# Patient Record
Sex: FEMALE | Race: AMERICAN INDIAN OR ALASKA NATIVE | NOT HISPANIC OR LATINO | URBAN - METROPOLITAN AREA
[De-identification: names, ages, dates, MRNs, and addresses within clinical notes are randomized per-mention and may not be internally consistent; named-entity substitution may affect disease eponyms.]

---

## 2017-06-07 ENCOUNTER — OUTPATIENT (OUTPATIENT)
Dept: OUTPATIENT SERVICES | Facility: HOSPITAL | Age: 82
LOS: 1 days | Discharge: HOME | End: 2017-06-07

## 2017-06-28 DIAGNOSIS — E11.9 TYPE 2 DIABETES MELLITUS WITHOUT COMPLICATIONS: ICD-10-CM

## 2017-09-25 ENCOUNTER — OUTPATIENT (OUTPATIENT)
Dept: OUTPATIENT SERVICES | Facility: HOSPITAL | Age: 82
LOS: 1 days | Discharge: HOME | End: 2017-09-25

## 2017-09-25 DIAGNOSIS — R94.8 ABNORMAL RESULTS OF FUNCTION STUDIES OF OTHER ORGANS AND SYSTEMS: ICD-10-CM

## 2019-11-11 PROBLEM — Z00.00 ENCOUNTER FOR PREVENTIVE HEALTH EXAMINATION: Status: ACTIVE | Noted: 2019-11-11

## 2021-01-01 ENCOUNTER — INPATIENT (INPATIENT)
Facility: HOSPITAL | Age: 86
LOS: 3 days | End: 2021-04-22
Attending: INTERNAL MEDICINE | Admitting: INTERNAL MEDICINE
Payer: MEDICARE

## 2021-01-01 VITALS
OXYGEN SATURATION: 98 % | DIASTOLIC BLOOD PRESSURE: 72 MMHG | SYSTOLIC BLOOD PRESSURE: 161 MMHG | HEART RATE: 88 BPM | RESPIRATION RATE: 16 BRPM

## 2021-01-01 VITALS
SYSTOLIC BLOOD PRESSURE: 83 MMHG | DIASTOLIC BLOOD PRESSURE: 42 MMHG | HEART RATE: 88 BPM | RESPIRATION RATE: 21 BRPM | OXYGEN SATURATION: 92 %

## 2021-01-01 DIAGNOSIS — Z71.89 OTHER SPECIFIED COUNSELING: ICD-10-CM

## 2021-01-01 DIAGNOSIS — R53.2 FUNCTIONAL QUADRIPLEGIA: ICD-10-CM

## 2021-01-01 DIAGNOSIS — I63.9 CEREBRAL INFARCTION, UNSPECIFIED: ICD-10-CM

## 2021-01-01 DIAGNOSIS — I48.91 UNSPECIFIED ATRIAL FIBRILLATION: ICD-10-CM

## 2021-01-01 DIAGNOSIS — L97.509 NON-PRESSURE CHRONIC ULCER OF OTHER PART OF UNSPECIFIED FOOT WITH UNSPECIFIED SEVERITY: ICD-10-CM

## 2021-01-01 DIAGNOSIS — R06.00 DYSPNEA, UNSPECIFIED: ICD-10-CM

## 2021-01-01 DIAGNOSIS — L97.909 NON-PRESSURE CHRONIC ULCER OF UNSPECIFIED PART OF UNSPECIFIED LOWER LEG WITH UNSPECIFIED SEVERITY: ICD-10-CM

## 2021-01-01 DIAGNOSIS — Z51.5 ENCOUNTER FOR PALLIATIVE CARE: ICD-10-CM

## 2021-01-01 LAB
A1C WITH ESTIMATED AVERAGE GLUCOSE RESULT: 7.7 % — HIGH (ref 4–5.6)
ALBUMIN SERPL ELPH-MCNC: 2.7 G/DL — LOW (ref 3.5–5.2)
ALBUMIN SERPL ELPH-MCNC: 3 G/DL — LOW (ref 3.5–5.2)
ALBUMIN SERPL ELPH-MCNC: 3.3 G/DL — LOW (ref 3.5–5.2)
ALBUMIN SERPL ELPH-MCNC: 3.6 G/DL — SIGNIFICANT CHANGE UP (ref 3.5–5.2)
ALP SERPL-CCNC: 104 U/L — SIGNIFICANT CHANGE UP (ref 30–115)
ALP SERPL-CCNC: 108 U/L — SIGNIFICANT CHANGE UP (ref 30–115)
ALP SERPL-CCNC: 112 U/L — SIGNIFICANT CHANGE UP (ref 30–115)
ALP SERPL-CCNC: 91 U/L — SIGNIFICANT CHANGE UP (ref 30–115)
ALT FLD-CCNC: 10 U/L — SIGNIFICANT CHANGE UP (ref 0–41)
ALT FLD-CCNC: 27 U/L — SIGNIFICANT CHANGE UP (ref 0–41)
ALT FLD-CCNC: 35 U/L — SIGNIFICANT CHANGE UP (ref 0–41)
ALT FLD-CCNC: 9 U/L — SIGNIFICANT CHANGE UP (ref 0–41)
ANION GAP SERPL CALC-SCNC: 13 MMOL/L — SIGNIFICANT CHANGE UP (ref 7–14)
ANION GAP SERPL CALC-SCNC: 15 MMOL/L — HIGH (ref 7–14)
ANION GAP SERPL CALC-SCNC: 17 MMOL/L — HIGH (ref 7–14)
ANION GAP SERPL CALC-SCNC: 17 MMOL/L — HIGH (ref 7–14)
APPEARANCE UR: ABNORMAL
APTT BLD: 26.2 SEC — LOW (ref 27–39.2)
APTT BLD: 27.1 SEC — SIGNIFICANT CHANGE UP (ref 27–39.2)
AST SERPL-CCNC: 15 U/L — SIGNIFICANT CHANGE UP (ref 0–41)
AST SERPL-CCNC: 21 U/L — SIGNIFICANT CHANGE UP (ref 0–41)
AST SERPL-CCNC: 59 U/L — HIGH (ref 0–41)
AST SERPL-CCNC: 83 U/L — HIGH (ref 0–41)
BACTERIA # UR AUTO: ABNORMAL
BASE EXCESS BLDV CALC-SCNC: -7.6 MMOL/L — LOW (ref -2–2)
BASOPHILS # BLD AUTO: 0.02 K/UL — SIGNIFICANT CHANGE UP (ref 0–0.2)
BASOPHILS # BLD AUTO: 0.02 K/UL — SIGNIFICANT CHANGE UP (ref 0–0.2)
BASOPHILS # BLD AUTO: 0.03 K/UL — SIGNIFICANT CHANGE UP (ref 0–0.2)
BASOPHILS NFR BLD AUTO: 0.1 % — SIGNIFICANT CHANGE UP (ref 0–1)
BASOPHILS NFR BLD AUTO: 0.2 % — SIGNIFICANT CHANGE UP (ref 0–1)
BASOPHILS NFR BLD AUTO: 0.2 % — SIGNIFICANT CHANGE UP (ref 0–1)
BILIRUB DIRECT SERPL-MCNC: 0.3 MG/DL — HIGH (ref 0–0.2)
BILIRUB INDIRECT FLD-MCNC: 0.3 MG/DL — SIGNIFICANT CHANGE UP (ref 0.2–1.2)
BILIRUB SERPL-MCNC: 0.6 MG/DL — SIGNIFICANT CHANGE UP (ref 0.2–1.2)
BILIRUB SERPL-MCNC: 0.7 MG/DL — SIGNIFICANT CHANGE UP (ref 0.2–1.2)
BILIRUB UR-MCNC: NEGATIVE — SIGNIFICANT CHANGE UP
BLD GP AB SCN SERPL QL: SIGNIFICANT CHANGE UP
BUN SERPL-MCNC: 30 MG/DL — HIGH (ref 10–20)
BUN SERPL-MCNC: 31 MG/DL — HIGH (ref 10–20)
BUN SERPL-MCNC: 37 MG/DL — HIGH (ref 10–20)
BUN SERPL-MCNC: 47 MG/DL — HIGH (ref 10–20)
CA-I SERPL-SCNC: 1.23 MMOL/L — SIGNIFICANT CHANGE UP (ref 1.12–1.3)
CALCIUM SERPL-MCNC: 8.6 MG/DL — SIGNIFICANT CHANGE UP (ref 8.5–10.1)
CALCIUM SERPL-MCNC: 8.7 MG/DL — SIGNIFICANT CHANGE UP (ref 8.5–10.1)
CALCIUM SERPL-MCNC: 8.7 MG/DL — SIGNIFICANT CHANGE UP (ref 8.5–10.1)
CALCIUM SERPL-MCNC: 8.9 MG/DL — SIGNIFICANT CHANGE UP (ref 8.5–10.1)
CHLORIDE SERPL-SCNC: 108 MMOL/L — SIGNIFICANT CHANGE UP (ref 98–110)
CHLORIDE SERPL-SCNC: 109 MMOL/L — SIGNIFICANT CHANGE UP (ref 98–110)
CHLORIDE SERPL-SCNC: 117 MMOL/L — HIGH (ref 98–110)
CHLORIDE SERPL-SCNC: 118 MMOL/L — HIGH (ref 98–110)
CHOLEST SERPL-MCNC: 153 MG/DL — SIGNIFICANT CHANGE UP
CO2 SERPL-SCNC: 12 MMOL/L — LOW (ref 17–32)
CO2 SERPL-SCNC: 14 MMOL/L — LOW (ref 17–32)
CO2 SERPL-SCNC: 15 MMOL/L — LOW (ref 17–32)
CO2 SERPL-SCNC: 17 MMOL/L — SIGNIFICANT CHANGE UP (ref 17–32)
COLOR SPEC: YELLOW — SIGNIFICANT CHANGE UP
COVID-19 SPIKE DOMAIN AB INTERP: NEGATIVE — SIGNIFICANT CHANGE UP
COVID-19 SPIKE DOMAIN ANTIBODY RESULT: 0.4 U/ML — SIGNIFICANT CHANGE UP
CREAT SERPL-MCNC: 2.2 MG/DL — HIGH (ref 0.7–1.5)
CREAT SERPL-MCNC: 2.4 MG/DL — HIGH (ref 0.7–1.5)
CREAT SERPL-MCNC: 2.9 MG/DL — HIGH (ref 0.7–1.5)
CREAT SERPL-MCNC: 3.5 MG/DL — HIGH (ref 0.7–1.5)
DIFF PNL FLD: ABNORMAL
EOSINOPHIL # BLD AUTO: 0 K/UL — SIGNIFICANT CHANGE UP (ref 0–0.7)
EOSINOPHIL # BLD AUTO: 0.01 K/UL — SIGNIFICANT CHANGE UP (ref 0–0.7)
EOSINOPHIL # BLD AUTO: 0.01 K/UL — SIGNIFICANT CHANGE UP (ref 0–0.7)
EOSINOPHIL NFR BLD AUTO: 0 % — SIGNIFICANT CHANGE UP (ref 0–8)
EOSINOPHIL NFR BLD AUTO: 0.1 % — SIGNIFICANT CHANGE UP (ref 0–8)
EOSINOPHIL NFR BLD AUTO: 0.1 % — SIGNIFICANT CHANGE UP (ref 0–8)
EPI CELLS # UR: 1 /HPF — SIGNIFICANT CHANGE UP (ref 0–5)
ESTIMATED AVERAGE GLUCOSE: 174 MG/DL — HIGH (ref 68–114)
GAS PNL BLDV: 140 MMOL/L — SIGNIFICANT CHANGE UP (ref 136–145)
GAS PNL BLDV: SIGNIFICANT CHANGE UP
GAS PNL BLDV: SIGNIFICANT CHANGE UP
GLUCOSE BLDC GLUCOMTR-MCNC: 102 MG/DL — HIGH (ref 70–99)
GLUCOSE BLDC GLUCOMTR-MCNC: 106 MG/DL — HIGH (ref 70–99)
GLUCOSE BLDC GLUCOMTR-MCNC: 113 MG/DL — HIGH (ref 70–99)
GLUCOSE BLDC GLUCOMTR-MCNC: 123 MG/DL — HIGH (ref 70–99)
GLUCOSE BLDC GLUCOMTR-MCNC: 130 MG/DL — HIGH (ref 70–99)
GLUCOSE BLDC GLUCOMTR-MCNC: 159 MG/DL — HIGH (ref 70–99)
GLUCOSE BLDC GLUCOMTR-MCNC: 162 MG/DL — HIGH (ref 70–99)
GLUCOSE BLDC GLUCOMTR-MCNC: 164 MG/DL — HIGH (ref 70–99)
GLUCOSE BLDC GLUCOMTR-MCNC: 168 MG/DL — HIGH (ref 70–99)
GLUCOSE BLDC GLUCOMTR-MCNC: 169 MG/DL — HIGH (ref 70–99)
GLUCOSE BLDC GLUCOMTR-MCNC: 171 MG/DL — HIGH (ref 70–99)
GLUCOSE BLDC GLUCOMTR-MCNC: 199 MG/DL — HIGH (ref 70–99)
GLUCOSE BLDC GLUCOMTR-MCNC: 203 MG/DL — HIGH (ref 70–99)
GLUCOSE BLDC GLUCOMTR-MCNC: 304 MG/DL — HIGH (ref 70–99)
GLUCOSE BLDC GLUCOMTR-MCNC: 315 MG/DL — HIGH (ref 70–99)
GLUCOSE BLDC GLUCOMTR-MCNC: 68 MG/DL — LOW (ref 70–99)
GLUCOSE BLDC GLUCOMTR-MCNC: 74 MG/DL — SIGNIFICANT CHANGE UP (ref 70–99)
GLUCOSE BLDC GLUCOMTR-MCNC: 78 MG/DL — SIGNIFICANT CHANGE UP (ref 70–99)
GLUCOSE BLDC GLUCOMTR-MCNC: 78 MG/DL — SIGNIFICANT CHANGE UP (ref 70–99)
GLUCOSE BLDC GLUCOMTR-MCNC: 83 MG/DL — SIGNIFICANT CHANGE UP (ref 70–99)
GLUCOSE BLDC GLUCOMTR-MCNC: 92 MG/DL — SIGNIFICANT CHANGE UP (ref 70–99)
GLUCOSE BLDC GLUCOMTR-MCNC: 94 MG/DL — SIGNIFICANT CHANGE UP (ref 70–99)
GLUCOSE SERPL-MCNC: 144 MG/DL — HIGH (ref 70–99)
GLUCOSE SERPL-MCNC: 196 MG/DL — HIGH (ref 70–99)
GLUCOSE SERPL-MCNC: 201 MG/DL — HIGH (ref 70–99)
GLUCOSE SERPL-MCNC: 59 MG/DL — LOW (ref 70–99)
GLUCOSE UR QL: NEGATIVE — SIGNIFICANT CHANGE UP
HCO3 BLDV-SCNC: 19 MMOL/L — LOW (ref 22–29)
HCT VFR BLD CALC: 30.2 % — LOW (ref 37–47)
HCT VFR BLD CALC: 31.8 % — LOW (ref 37–47)
HCT VFR BLD CALC: 33.2 % — LOW (ref 37–47)
HCT VFR BLDA CALC: 31.5 % — LOW (ref 34–44)
HDLC SERPL-MCNC: 48 MG/DL — LOW
HGB BLD CALC-MCNC: 10.3 G/DL — LOW (ref 14–18)
HGB BLD-MCNC: 10.1 G/DL — LOW (ref 12–16)
HGB BLD-MCNC: 8.9 G/DL — LOW (ref 12–16)
HGB BLD-MCNC: 9.6 G/DL — LOW (ref 12–16)
HYALINE CASTS # UR AUTO: 4 /LPF — SIGNIFICANT CHANGE UP (ref 0–7)
IMM GRANULOCYTES NFR BLD AUTO: 0.4 % — HIGH (ref 0.1–0.3)
IMM GRANULOCYTES NFR BLD AUTO: 0.5 % — HIGH (ref 0.1–0.3)
IMM GRANULOCYTES NFR BLD AUTO: 0.6 % — HIGH (ref 0.1–0.3)
INR BLD: 1.19 RATIO — SIGNIFICANT CHANGE UP (ref 0.65–1.3)
INR BLD: 1.31 RATIO — HIGH (ref 0.65–1.3)
KETONES UR-MCNC: SIGNIFICANT CHANGE UP
LACTATE BLDV-MCNC: 1.6 MMOL/L — SIGNIFICANT CHANGE UP (ref 0.5–1.6)
LEUKOCYTE ESTERASE UR-ACNC: ABNORMAL
LIPID PNL WITH DIRECT LDL SERPL: 82 MG/DL — SIGNIFICANT CHANGE UP
LYMPHOCYTES # BLD AUTO: 1.42 K/UL — SIGNIFICANT CHANGE UP (ref 1.2–3.4)
LYMPHOCYTES # BLD AUTO: 10.1 % — LOW (ref 20.5–51.1)
LYMPHOCYTES # BLD AUTO: 11.1 % — LOW (ref 20.5–51.1)
LYMPHOCYTES # BLD AUTO: 2.06 K/UL — SIGNIFICANT CHANGE UP (ref 1.2–3.4)
LYMPHOCYTES # BLD AUTO: 2.53 K/UL — SIGNIFICANT CHANGE UP (ref 1.2–3.4)
LYMPHOCYTES # BLD AUTO: 25.8 % — SIGNIFICANT CHANGE UP (ref 20.5–51.1)
MAGNESIUM SERPL-MCNC: 2.1 MG/DL — SIGNIFICANT CHANGE UP (ref 1.8–2.4)
MAGNESIUM SERPL-MCNC: 2.2 MG/DL — SIGNIFICANT CHANGE UP (ref 1.8–2.4)
MCHC RBC-ENTMCNC: 24.3 PG — LOW (ref 27–31)
MCHC RBC-ENTMCNC: 24.4 PG — LOW (ref 27–31)
MCHC RBC-ENTMCNC: 24.4 PG — LOW (ref 27–31)
MCHC RBC-ENTMCNC: 29.5 G/DL — LOW (ref 32–37)
MCHC RBC-ENTMCNC: 30.2 G/DL — LOW (ref 32–37)
MCHC RBC-ENTMCNC: 30.4 G/DL — LOW (ref 32–37)
MCV RBC AUTO: 80.2 FL — LOW (ref 81–99)
MCV RBC AUTO: 80.9 FL — LOW (ref 81–99)
MCV RBC AUTO: 82.3 FL — SIGNIFICANT CHANGE UP (ref 81–99)
MONOCYTES # BLD AUTO: 0.73 K/UL — HIGH (ref 0.1–0.6)
MONOCYTES # BLD AUTO: 0.93 K/UL — HIGH (ref 0.1–0.6)
MONOCYTES # BLD AUTO: 0.99 K/UL — HIGH (ref 0.1–0.6)
MONOCYTES NFR BLD AUTO: 5.3 % — SIGNIFICANT CHANGE UP (ref 1.7–9.3)
MONOCYTES NFR BLD AUTO: 6.6 % — SIGNIFICANT CHANGE UP (ref 1.7–9.3)
MONOCYTES NFR BLD AUTO: 7.4 % — SIGNIFICANT CHANGE UP (ref 1.7–9.3)
NEUTROPHILS # BLD AUTO: 11.58 K/UL — HIGH (ref 1.4–6.5)
NEUTROPHILS # BLD AUTO: 15.33 K/UL — HIGH (ref 1.4–6.5)
NEUTROPHILS # BLD AUTO: 6.47 K/UL — SIGNIFICANT CHANGE UP (ref 1.4–6.5)
NEUTROPHILS NFR BLD AUTO: 66.1 % — SIGNIFICANT CHANGE UP (ref 42.2–75.2)
NEUTROPHILS NFR BLD AUTO: 82.6 % — HIGH (ref 42.2–75.2)
NEUTROPHILS NFR BLD AUTO: 82.8 % — HIGH (ref 42.2–75.2)
NITRITE UR-MCNC: NEGATIVE — SIGNIFICANT CHANGE UP
NON HDL CHOLESTEROL: 105 MG/DL — SIGNIFICANT CHANGE UP
NRBC # BLD: 0 /100 WBCS — SIGNIFICANT CHANGE UP (ref 0–0)
NT-PROBNP SERPL-SCNC: HIGH PG/ML (ref 0–300)
PCO2 BLDV: 42 MMHG — SIGNIFICANT CHANGE UP (ref 39–42)
PH BLDV: 7.26 — SIGNIFICANT CHANGE UP (ref 7.26–7.43)
PH UR: 6 — SIGNIFICANT CHANGE UP (ref 5–8)
PLATELET # BLD AUTO: 336 K/UL — SIGNIFICANT CHANGE UP (ref 130–400)
PLATELET # BLD AUTO: 351 K/UL — SIGNIFICANT CHANGE UP (ref 130–400)
PLATELET # BLD AUTO: 358 K/UL — SIGNIFICANT CHANGE UP (ref 130–400)
PO2 BLDV: 31 MMHG — SIGNIFICANT CHANGE UP (ref 20–40)
POTASSIUM BLDV-SCNC: 5.2 MMOL/L — SIGNIFICANT CHANGE UP (ref 3.3–5.6)
POTASSIUM SERPL-MCNC: 4.8 MMOL/L — SIGNIFICANT CHANGE UP (ref 3.5–5)
POTASSIUM SERPL-MCNC: 4.9 MMOL/L — SIGNIFICANT CHANGE UP (ref 3.5–5)
POTASSIUM SERPL-MCNC: 5.5 MMOL/L — HIGH (ref 3.5–5)
POTASSIUM SERPL-MCNC: 5.6 MMOL/L — HIGH (ref 3.5–5)
POTASSIUM SERPL-SCNC: 4.8 MMOL/L — SIGNIFICANT CHANGE UP (ref 3.5–5)
POTASSIUM SERPL-SCNC: 4.9 MMOL/L — SIGNIFICANT CHANGE UP (ref 3.5–5)
POTASSIUM SERPL-SCNC: 5.5 MMOL/L — HIGH (ref 3.5–5)
POTASSIUM SERPL-SCNC: 5.6 MMOL/L — HIGH (ref 3.5–5)
PROT SERPL-MCNC: 5.3 G/DL — LOW (ref 6–8)
PROT SERPL-MCNC: 5.6 G/DL — LOW (ref 6–8)
PROT SERPL-MCNC: 6 G/DL — SIGNIFICANT CHANGE UP (ref 6–8)
PROT SERPL-MCNC: 6.4 G/DL — SIGNIFICANT CHANGE UP (ref 6–8)
PROT UR-MCNC: ABNORMAL
PROTHROM AB SERPL-ACNC: 13.7 SEC — HIGH (ref 9.95–12.87)
PROTHROM AB SERPL-ACNC: 15.1 SEC — HIGH (ref 9.95–12.87)
RAPID RVP RESULT: SIGNIFICANT CHANGE UP
RBC # BLD: 3.67 M/UL — LOW (ref 4.2–5.4)
RBC # BLD: 3.93 M/UL — LOW (ref 4.2–5.4)
RBC # BLD: 4.14 M/UL — LOW (ref 4.2–5.4)
RBC # FLD: 16.2 % — HIGH (ref 11.5–14.5)
RBC # FLD: 16.7 % — HIGH (ref 11.5–14.5)
RBC # FLD: 17.2 % — HIGH (ref 11.5–14.5)
RBC CASTS # UR COMP ASSIST: 12 /HPF — HIGH (ref 0–4)
SAO2 % BLDV: 53 % — SIGNIFICANT CHANGE UP
SARS-COV-2 IGG+IGM SERPL QL IA: 0.4 U/ML — SIGNIFICANT CHANGE UP
SARS-COV-2 IGG+IGM SERPL QL IA: NEGATIVE — SIGNIFICANT CHANGE UP
SARS-COV-2 RNA SPEC QL NAA+PROBE: SIGNIFICANT CHANGE UP
SODIUM SERPL-SCNC: 138 MMOL/L — SIGNIFICANT CHANGE UP (ref 135–146)
SODIUM SERPL-SCNC: 140 MMOL/L — SIGNIFICANT CHANGE UP (ref 135–146)
SODIUM SERPL-SCNC: 147 MMOL/L — HIGH (ref 135–146)
SODIUM SERPL-SCNC: 147 MMOL/L — HIGH (ref 135–146)
SP GR SPEC: 1.05 — HIGH (ref 1.01–1.03)
TRIGL SERPL-MCNC: 136 MG/DL — SIGNIFICANT CHANGE UP
TROPONIN T SERPL-MCNC: 0.15 NG/ML — CRITICAL HIGH
UROBILINOGEN FLD QL: SIGNIFICANT CHANGE UP
WBC # BLD: 14.04 K/UL — HIGH (ref 4.8–10.8)
WBC # BLD: 18.51 K/UL — HIGH (ref 4.8–10.8)
WBC # BLD: 9.8 K/UL — SIGNIFICANT CHANGE UP (ref 4.8–10.8)
WBC # FLD AUTO: 14.04 K/UL — HIGH (ref 4.8–10.8)
WBC # FLD AUTO: 18.51 K/UL — HIGH (ref 4.8–10.8)
WBC # FLD AUTO: 9.8 K/UL — SIGNIFICANT CHANGE UP (ref 4.8–10.8)
WBC UR QL: 262 /HPF — HIGH (ref 0–5)

## 2021-01-01 PROCEDURE — 99497 ADVNCD CARE PLAN 30 MIN: CPT

## 2021-01-01 PROCEDURE — 70496 CT ANGIOGRAPHY HEAD: CPT | Mod: 26

## 2021-01-01 PROCEDURE — 99233 SBSQ HOSP IP/OBS HIGH 50: CPT

## 2021-01-01 PROCEDURE — 70551 MRI BRAIN STEM W/O DYE: CPT | Mod: 26

## 2021-01-01 PROCEDURE — 99497 ADVNCD CARE PLAN 30 MIN: CPT | Mod: 25

## 2021-01-01 PROCEDURE — 99222 1ST HOSP IP/OBS MODERATE 55: CPT

## 2021-01-01 PROCEDURE — 99223 1ST HOSP IP/OBS HIGH 75: CPT

## 2021-01-01 PROCEDURE — 71045 X-RAY EXAM CHEST 1 VIEW: CPT | Mod: 26

## 2021-01-01 PROCEDURE — 99232 SBSQ HOSP IP/OBS MODERATE 35: CPT

## 2021-01-01 PROCEDURE — 99291 CRITICAL CARE FIRST HOUR: CPT | Mod: CS,GC

## 2021-01-01 PROCEDURE — 70450 CT HEAD/BRAIN W/O DYE: CPT | Mod: 26,59,MA

## 2021-01-01 PROCEDURE — 70498 CT ANGIOGRAPHY NECK: CPT | Mod: 26

## 2021-01-01 PROCEDURE — 93010 ELECTROCARDIOGRAM REPORT: CPT

## 2021-01-01 PROCEDURE — 99221 1ST HOSP IP/OBS SF/LOW 40: CPT

## 2021-01-01 PROCEDURE — 0042T: CPT

## 2021-01-01 RX ORDER — AMLODIPINE BESYLATE 2.5 MG/1
5 TABLET ORAL DAILY
Refills: 0 | Status: DISCONTINUED | OUTPATIENT
Start: 2021-01-01 | End: 2021-01-01

## 2021-01-01 RX ORDER — ENOXAPARIN SODIUM 100 MG/ML
40 INJECTION SUBCUTANEOUS DAILY
Refills: 0 | Status: DISCONTINUED | OUTPATIENT
Start: 2021-01-01 | End: 2021-01-01

## 2021-01-01 RX ORDER — SODIUM CHLORIDE 9 MG/ML
1000 INJECTION INTRAMUSCULAR; INTRAVENOUS; SUBCUTANEOUS
Refills: 0 | Status: DISCONTINUED | OUTPATIENT
Start: 2021-01-01 | End: 2021-01-01

## 2021-01-01 RX ORDER — DEXTROSE 50 % IN WATER 50 %
15 SYRINGE (ML) INTRAVENOUS ONCE
Refills: 0 | Status: DISCONTINUED | OUTPATIENT
Start: 2021-01-01 | End: 2021-01-01

## 2021-01-01 RX ORDER — METOPROLOL TARTRATE 50 MG
5 TABLET ORAL ONCE
Refills: 0 | Status: COMPLETED | OUTPATIENT
Start: 2021-01-01 | End: 2021-01-01

## 2021-01-01 RX ORDER — SCOPALAMINE 1 MG/3D
1 PATCH, EXTENDED RELEASE TRANSDERMAL
Refills: 0 | Status: DISCONTINUED | OUTPATIENT
Start: 2021-01-01 | End: 2021-01-01

## 2021-01-01 RX ORDER — GLUCAGON INJECTION, SOLUTION 0.5 MG/.1ML
1 INJECTION, SOLUTION SUBCUTANEOUS ONCE
Refills: 0 | Status: DISCONTINUED | OUTPATIENT
Start: 2021-01-01 | End: 2021-01-01

## 2021-01-01 RX ORDER — SODIUM CHLORIDE 9 MG/ML
1000 INJECTION, SOLUTION INTRAVENOUS ONCE
Refills: 0 | Status: COMPLETED | OUTPATIENT
Start: 2021-01-01 | End: 2021-01-01

## 2021-01-01 RX ORDER — GLYBURIDE 5 MG
2 TABLET ORAL
Qty: 0 | Refills: 0 | DISCHARGE

## 2021-01-01 RX ORDER — CLOPIDOGREL BISULFATE 75 MG/1
75 TABLET, FILM COATED ORAL DAILY
Refills: 0 | Status: DISCONTINUED | OUTPATIENT
Start: 2021-01-01 | End: 2021-01-01

## 2021-01-01 RX ORDER — ASPIRIN/CALCIUM CARB/MAGNESIUM 324 MG
300 TABLET ORAL DAILY
Refills: 0 | Status: DISCONTINUED | OUTPATIENT
Start: 2021-01-01 | End: 2021-01-01

## 2021-01-01 RX ORDER — DEXTROSE 50 % IN WATER 50 %
25 SYRINGE (ML) INTRAVENOUS ONCE
Refills: 0 | Status: DISCONTINUED | OUTPATIENT
Start: 2021-01-01 | End: 2021-01-01

## 2021-01-01 RX ORDER — AMLODIPINE BESYLATE 2.5 MG/1
1 TABLET ORAL
Qty: 0 | Refills: 0 | DISCHARGE

## 2021-01-01 RX ORDER — SODIUM CHLORIDE 9 MG/ML
1000 INJECTION, SOLUTION INTRAVENOUS
Refills: 0 | Status: DISCONTINUED | OUTPATIENT
Start: 2021-01-01 | End: 2021-01-01

## 2021-01-01 RX ORDER — ATORVASTATIN CALCIUM 80 MG/1
80 TABLET, FILM COATED ORAL AT BEDTIME
Refills: 0 | Status: DISCONTINUED | OUTPATIENT
Start: 2021-01-01 | End: 2021-01-01

## 2021-01-01 RX ORDER — INSULIN HUMAN 100 [IU]/ML
10 INJECTION, SOLUTION SUBCUTANEOUS ONCE
Refills: 0 | Status: COMPLETED | OUTPATIENT
Start: 2021-01-01 | End: 2021-01-01

## 2021-01-01 RX ORDER — HEPARIN SODIUM 5000 [USP'U]/ML
5000 INJECTION INTRAVENOUS; SUBCUTANEOUS EVERY 12 HOURS
Refills: 0 | Status: DISCONTINUED | OUTPATIENT
Start: 2021-01-01 | End: 2021-01-01

## 2021-01-01 RX ORDER — DEXTROSE 50 % IN WATER 50 %
50 SYRINGE (ML) INTRAVENOUS ONCE
Refills: 0 | Status: COMPLETED | OUTPATIENT
Start: 2021-01-01 | End: 2021-01-01

## 2021-01-01 RX ORDER — COLLAGENASE CLOSTRIDIUM HIST. 250 UNIT/G
1 OINTMENT (GRAM) TOPICAL
Refills: 0 | Status: DISCONTINUED | OUTPATIENT
Start: 2021-01-01 | End: 2021-01-01

## 2021-01-01 RX ORDER — INSULIN GLARGINE 100 [IU]/ML
10 INJECTION, SOLUTION SUBCUTANEOUS AT BEDTIME
Refills: 0 | Status: DISCONTINUED | OUTPATIENT
Start: 2021-01-01 | End: 2021-01-01

## 2021-01-01 RX ORDER — ACETAMINOPHEN 500 MG
650 TABLET ORAL EVERY 6 HOURS
Refills: 0 | Status: DISCONTINUED | OUTPATIENT
Start: 2021-01-01 | End: 2021-01-01

## 2021-01-01 RX ORDER — METFORMIN HYDROCHLORIDE 850 MG/1
1 TABLET ORAL
Qty: 0 | Refills: 0 | DISCHARGE

## 2021-01-01 RX ORDER — DEXTROSE 50 % IN WATER 50 %
12.5 SYRINGE (ML) INTRAVENOUS ONCE
Refills: 0 | Status: DISCONTINUED | OUTPATIENT
Start: 2021-01-01 | End: 2021-01-01

## 2021-01-01 RX ORDER — CEFEPIME 1 G/1
2000 INJECTION, POWDER, FOR SOLUTION INTRAMUSCULAR; INTRAVENOUS ONCE
Refills: 0 | Status: COMPLETED | OUTPATIENT
Start: 2021-01-01 | End: 2021-01-01

## 2021-01-01 RX ORDER — MORPHINE SULFATE 50 MG/1
5 CAPSULE, EXTENDED RELEASE ORAL EVERY 6 HOURS
Refills: 0 | Status: DISCONTINUED | OUTPATIENT
Start: 2021-01-01 | End: 2021-01-01

## 2021-01-01 RX ORDER — MIDODRINE HYDROCHLORIDE 2.5 MG/1
10 TABLET ORAL THREE TIMES A DAY
Refills: 0 | Status: DISCONTINUED | OUTPATIENT
Start: 2021-01-01 | End: 2021-01-01

## 2021-01-01 RX ORDER — FUROSEMIDE 40 MG
40 TABLET ORAL ONCE
Refills: 0 | Status: COMPLETED | OUTPATIENT
Start: 2021-01-01 | End: 2021-01-01

## 2021-01-01 RX ORDER — INSULIN LISPRO 100/ML
VIAL (ML) SUBCUTANEOUS
Refills: 0 | Status: DISCONTINUED | OUTPATIENT
Start: 2021-01-01 | End: 2021-01-01

## 2021-01-01 RX ORDER — INSULIN LISPRO 100/ML
3 VIAL (ML) SUBCUTANEOUS
Refills: 0 | Status: DISCONTINUED | OUTPATIENT
Start: 2021-01-01 | End: 2021-01-01

## 2021-01-01 RX ORDER — SODIUM CHLORIDE 9 MG/ML
500 INJECTION INTRAMUSCULAR; INTRAVENOUS; SUBCUTANEOUS ONCE
Refills: 0 | Status: COMPLETED | OUTPATIENT
Start: 2021-01-01 | End: 2021-01-01

## 2021-01-01 RX ADMIN — INSULIN HUMAN 10 UNIT(S): 100 INJECTION, SOLUTION SUBCUTANEOUS at 12:36

## 2021-01-01 RX ADMIN — Medication 50 MILLILITER(S): at 12:36

## 2021-01-01 RX ADMIN — Medication 300 MILLIGRAM(S): at 12:13

## 2021-01-01 RX ADMIN — Medication 1 APPLICATION(S): at 05:24

## 2021-01-01 RX ADMIN — SCOPALAMINE 1 PATCH: 1 PATCH, EXTENDED RELEASE TRANSDERMAL at 19:26

## 2021-01-01 RX ADMIN — Medication 1 APPLICATION(S): at 05:49

## 2021-01-01 RX ADMIN — SODIUM CHLORIDE 65 MILLILITER(S): 9 INJECTION INTRAMUSCULAR; INTRAVENOUS; SUBCUTANEOUS at 12:36

## 2021-01-01 RX ADMIN — Medication 2: at 05:50

## 2021-01-01 RX ADMIN — SODIUM CHLORIDE 65 MILLILITER(S): 9 INJECTION INTRAMUSCULAR; INTRAVENOUS; SUBCUTANEOUS at 00:05

## 2021-01-01 RX ADMIN — Medication 300 MILLIGRAM(S): at 21:53

## 2021-01-01 RX ADMIN — Medication 300 MILLIGRAM(S): at 15:58

## 2021-01-01 RX ADMIN — Medication 300 MILLIGRAM(S): at 14:48

## 2021-01-01 RX ADMIN — Medication 1 APPLICATION(S): at 18:12

## 2021-01-01 RX ADMIN — Medication 5 MILLIGRAM(S): at 00:05

## 2021-01-01 RX ADMIN — HEPARIN SODIUM 5000 UNIT(S): 5000 INJECTION INTRAVENOUS; SUBCUTANEOUS at 18:12

## 2021-01-01 RX ADMIN — CEFEPIME 100 MILLIGRAM(S): 1 INJECTION, POWDER, FOR SOLUTION INTRAMUSCULAR; INTRAVENOUS at 17:23

## 2021-01-01 RX ADMIN — SODIUM CHLORIDE 65 MILLILITER(S): 9 INJECTION INTRAMUSCULAR; INTRAVENOUS; SUBCUTANEOUS at 18:16

## 2021-01-01 RX ADMIN — INSULIN GLARGINE 10 UNIT(S): 100 INJECTION, SOLUTION SUBCUTANEOUS at 21:57

## 2021-01-01 RX ADMIN — Medication 5 MILLIGRAM(S): at 00:48

## 2021-01-01 RX ADMIN — SODIUM CHLORIDE 2000 MILLILITER(S): 9 INJECTION INTRAMUSCULAR; INTRAVENOUS; SUBCUTANEOUS at 20:24

## 2021-01-01 RX ADMIN — SCOPALAMINE 1 PATCH: 1 PATCH, EXTENDED RELEASE TRANSDERMAL at 03:31

## 2021-01-01 RX ADMIN — Medication 1 APPLICATION(S): at 17:03

## 2021-01-01 RX ADMIN — Medication 40 MILLIGRAM(S): at 03:32

## 2021-01-01 RX ADMIN — SODIUM CHLORIDE 1000 MILLILITER(S): 9 INJECTION, SOLUTION INTRAVENOUS at 18:00

## 2021-01-01 RX ADMIN — HEPARIN SODIUM 5000 UNIT(S): 5000 INJECTION INTRAVENOUS; SUBCUTANEOUS at 05:49

## 2021-01-01 RX ADMIN — SODIUM CHLORIDE 65 MILLILITER(S): 9 INJECTION INTRAMUSCULAR; INTRAVENOUS; SUBCUTANEOUS at 08:14

## 2021-01-01 RX ADMIN — HEPARIN SODIUM 5000 UNIT(S): 5000 INJECTION INTRAVENOUS; SUBCUTANEOUS at 17:50

## 2021-01-01 RX ADMIN — SCOPALAMINE 1 PATCH: 1 PATCH, EXTENDED RELEASE TRANSDERMAL at 19:40

## 2021-01-01 RX ADMIN — Medication 3 UNIT(S): at 05:51

## 2021-01-01 RX ADMIN — HEPARIN SODIUM 5000 UNIT(S): 5000 INJECTION INTRAVENOUS; SUBCUTANEOUS at 05:24

## 2021-01-01 RX ADMIN — ATORVASTATIN CALCIUM 80 MILLIGRAM(S): 80 TABLET, FILM COATED ORAL at 22:41

## 2021-01-01 RX ADMIN — Medication 50 MILLILITER(S): at 05:23

## 2021-01-01 RX ADMIN — Medication 8: at 12:10

## 2021-01-01 RX ADMIN — Medication 650 MILLIGRAM(S): at 06:05

## 2021-01-01 RX ADMIN — CLOPIDOGREL BISULFATE 75 MILLIGRAM(S): 75 TABLET, FILM COATED ORAL at 12:13

## 2021-01-01 RX ADMIN — Medication 50 MILLILITER(S): at 12:58

## 2021-01-01 RX ADMIN — HEPARIN SODIUM 5000 UNIT(S): 5000 INJECTION INTRAVENOUS; SUBCUTANEOUS at 18:05

## 2021-01-01 RX ADMIN — SCOPALAMINE 1 PATCH: 1 PATCH, EXTENDED RELEASE TRANSDERMAL at 19:30

## 2021-01-01 RX ADMIN — Medication 3 UNIT(S): at 12:11

## 2021-04-18 NOTE — ED PROVIDER NOTE - PHYSICAL EXAMINATION
CONSTITUTIONAL: in no acute distress.   SKIN: warm, dry  HEAD: Normocephalic.  EYES: PERRL.  ENT: Airway clear.  NECK: Supple.  LYMPH: No acute cervical adenopathy.  CARD: Regular rate and rhythm.   RESP: No wheezing, rales or rhonchi.  ABD: soft ntnd  EXT: No clubbing, cyanosis.   NEURO: Does not respond to pain but makes occasional purposeful movements, pupils reactive to light  PSYCH: Cooperative, appropriate.

## 2021-04-18 NOTE — ED PROVIDER NOTE - IMAGING STUDIES ADDITIONAL INFORMATION FREE TEXT
updated wet read - +L sided pleural effusion, apparently new compared to prior though limited evaluation 2/2 rotated positioning

## 2021-04-18 NOTE — ED PROVIDER NOTE - CARE PLAN
Principal Discharge DX:	Stroke  Secondary Diagnosis:	Atrial fibrillation with RVR  Secondary Diagnosis:	Elevated troponin  Secondary Diagnosis:	CARON (acute kidney injury)  Secondary Diagnosis:	Leg ulcer

## 2021-04-18 NOTE — CONSULT NOTE ADULT - SUBJECTIVE AND OBJECTIVE BOX
**STROKE CODE CONSULT NOTE**    Last known well time/Time of onset of symptoms: 4/18 1 pm     HPI: 89 year old female, PMHx dm, ulcers, left leg fx, ulcers, chf, afib presents for AMS. As per son at bedside patient usually is verbal and has eyes open. Today starting around 1 pm, son noticed that patient was unresponsive and not able to open eyes. Patient was recently discharged from [facility] s/p fall, leg fx and ulcer debridements. Stroke code activated in ed.     PAST MEDICAL & SURGICAL HISTORY:  Diabetes mellitus    CHF (congestive heart failure)      SOCIAL HISTORY:  Denies smoking, drinking, or drug use        MEDICATIONS  (STANDING):    MEDICATIONS  (PRN):      Allergies    No Known Allergies    Intolerances        Vital Signs Last 24 Hrs  T(C): --  T(F): --  HR: 88 (18 Apr 2021 16:55) (88 - 88)  BP: 161/72 (18 Apr 2021 16:55) (161/72 - 161/72)  BP(mean): --  RR: 16 (18 Apr 2021 16:55) (16 - 16)  SpO2: 98% (18 Apr 2021 16:55) (98% - 98%)    PHYSICAL EXAM:  Constitutional: WDWN; NAD    NIH Stroke Scale: LOC	(2) Not alert; requires repeated stimulation to attend, or is obtunded and requires strong or painful stimulation to make movements (not stereotyped)  NIH Stroke Scale: LOC Question	(0) Answers both questions correctly  NIH Stroke Scale: LOC Command	(0) Performs both tasks correctly  NIH Stroke Scale: Gaze	(0) Normal  NIH Stroke Scale: Visual	(0) No visual loss  NIH Stroke Scale: Facial	(2) Partial paralysis (total or near-total paralysis of lower face)  NIH Stroke Scale: Arm Left	(3) No effort against gravity; limb falls  NIH Stroke Scale: Arm Right	(3) No effort against gravity; limb falls  NIH Stroke Scale: Leg Left	(3) No effort against gravity; leg falls to bed immediately  NIH Stroke Scale: Leg Right	(3) No effort against gravity; leg falls to bed immediately  NIH Stroke Scale: Ataxia	(0) Absent  NIH Stroke Scale: Sensory	(0) Normal; no sensory loss  NIH Stroke Scale: Language	(0) No aphasia; normal  NIH Stroke Scale: Dysarthria	(0) Normal  NIH Stroke Scale: Extinct Inattention	(0) No abnormality  NIH Stroke Scale: Total	16    mRS Score	(5) Severe disability. Requires constant nursing care and attention, bedridden, incontent.    Fingerstick Blood Glucose: CAPILLARY BLOOD GLUCOSE  155 (18 Apr 2021 18:06)        LABS:                        10.1   9.80  )-----------( 351      ( 18 Apr 2021 17:04 )             33.2     04-18    138  |  108  |  30<H>  ----------------------------<  144<H>  5.5<H>   |  15<L>  |  2.2<H>    Ca    8.9      18 Apr 2021 17:04    TPro  6.4  /  Alb  3.6  /  TBili  0.6  /  DBili  x   /  AST  21  /  ALT  9   /  AlkPhos  104  04-18    PT/INR - ( 18 Apr 2021 17:04 )   PT: 13.70 sec;   INR: 1.19 ratio         PTT - ( 18 Apr 2021 17:04 )  PTT:27.1 sec      RADIOLOGY & ADDITIONAL STUDIES:    Reason IV-tPA not given: Out of window for TPA     ASSESSMENT/PLAN:    89y Female w/ PMH coming in with AMS.     CT head: Findings consistent with left LOY/MCA territorial infarct with mild mass effect. No midline shift. No intracranial hemorrhage.  CTA/CTP with   Initial NIHSS 16    1)Admit to Tele Unit, medicine   -Load with Asa and plavix   -Start Atorvastatin 80 mg daily     2) Labs: Obtain Hemoglobin A1c, Lipid profile set, and TSH    3) Other tests:  -Stroke neuro checks q1 hours  -MRI  -ASAEL (NPO after midnight)  -SBP goal   -PT and OT eval    4)DVT ppx - DVT prophylaxis and SCDs    No plans for NI intervention as per Dr. Handley, Telestroke     Neurocritical Care will follow  Elli Vasquez NP  ex. [phone]           **STROKE CODE CONSULT NOTE**    Last known well time/Time of onset of symptoms: 4/18 1 pm     HPI: 89 year old female, PMHx dm, ulcers, left leg fx, ulcers, chf, afib presents for AMS. As per son at bedside patient usually is verbal and has eyes open. Today starting around 1 pm, son noticed that patient was unresponsive and not able to open eyes. Patient was recently discharged from [facility] s/p fall, leg fx and ulcer debridements. Stroke code activated in ed.     PAST MEDICAL & SURGICAL HISTORY:  Diabetes mellitus    CHF (congestive heart failure)      SOCIAL HISTORY:  Denies smoking, drinking, or drug use        MEDICATIONS  (STANDING):    MEDICATIONS  (PRN):      Allergies    No Known Allergies    Intolerances        Vital Signs Last 24 Hrs  T(C): --  T(F): --  HR: 88 (18 Apr 2021 16:55) (88 - 88)  BP: 161/72 (18 Apr 2021 16:55) (161/72 - 161/72)  BP(mean): --  RR: 16 (18 Apr 2021 16:55) (16 - 16)  SpO2: 98% (18 Apr 2021 16:55) (98% - 98%)    PHYSICAL EXAM:  Constitutional: WDWN; NAD    NIH Stroke Scale: LOC	(2) Not alert; requires repeated stimulation to attend, or is obtunded and requires strong or painful stimulation to make movements (not stereotyped)  NIH Stroke Scale: LOC Question	(0) Answers both questions correctly  NIH Stroke Scale: LOC Command	(0) Performs both tasks correctly  NIH Stroke Scale: Gaze	(0) Normal  NIH Stroke Scale: Visual	(0) No visual loss  NIH Stroke Scale: Facial	(2) Partial paralysis (total or near-total paralysis of lower face)  NIH Stroke Scale: Arm Left	(3) No effort against gravity; limb falls  NIH Stroke Scale: Arm Right	(3) No effort against gravity; limb falls  NIH Stroke Scale: Leg Left	(3) No effort against gravity; leg falls to bed immediately  NIH Stroke Scale: Leg Right	(3) No effort against gravity; leg falls to bed immediately  NIH Stroke Scale: Ataxia	(0) Absent  NIH Stroke Scale: Sensory	(0) Normal; no sensory loss  NIH Stroke Scale: Language	(0) No aphasia; normal  NIH Stroke Scale: Dysarthria	(0) Normal  NIH Stroke Scale: Extinct Inattention	(0) No abnormality  NIH Stroke Scale: Total	16    mRS Score	(5) Severe disability. Requires constant nursing care and attention, bedridden, incontent.    Fingerstick Blood Glucose: CAPILLARY BLOOD GLUCOSE  155 (18 Apr 2021 18:06)        LABS:                        10.1   9.80  )-----------( 351      ( 18 Apr 2021 17:04 )             33.2     04-18    138  |  108  |  30<H>  ----------------------------<  144<H>  5.5<H>   |  15<L>  |  2.2<H>    Ca    8.9      18 Apr 2021 17:04    TPro  6.4  /  Alb  3.6  /  TBili  0.6  /  DBili  x   /  AST  21  /  ALT  9   /  AlkPhos  104  04-18    PT/INR - ( 18 Apr 2021 17:04 )   PT: 13.70 sec;   INR: 1.19 ratio         PTT - ( 18 Apr 2021 17:04 )  PTT:27.1 sec      RADIOLOGY & ADDITIONAL STUDIES:    Reason IV-tPA not given: Out of window for TPA     ASSESSMENT/PLAN:    89y Female w/ PMH coming in with AMS.     CT head: Findings consistent with left LOY/MCA territorial infarct with mild mass effect. No midline shift. No intracranial hemorrhage.  CTA/CTP with Left carotid occulsion. F/u Official read [ ]   Initial NIHSS 16    1)Admit to Tele Unit, medicine   -Load with Asa and plavix   -Start Atorvastatin 80 mg daily     2) Labs: Obtain Hemoglobin A1c, Lipid profile set, and TSH    3) Other tests:  -Stroke neuro checks q1 hours  -MRI  -ASAEL (NPO after midnight)  -SBP goal 140-160  -PT and OT eval    4)DVT ppx - DVT prophylaxis and SCDs    No plans for NI intervention as per Dr. Handley, Telestroke     Neurocritical Care will follow  Elli Vasquez NP  ex. [phone]

## 2021-04-18 NOTE — ED ADULT NURSE NOTE - OBJECTIVE STATEMENT
Pts son was unable to wake her up this afternoon. Pt was last seen by son around 4:30am. Stroke code was activated.

## 2021-04-18 NOTE — H&P ADULT - ASSESSMENT
89 years old female with PMHx of DM, HTN, bilateral diabetic foot ulcers, Atrial fibrillation ( no AC) , CHF, who presents to ED with complaint of non-responsiveness.    Impression :    # acute ischemic stroke  # bilateral diabetic foot ulcers, Left tibial Fracture    # acute ischemic stroke  - CTH shows acute infarct in left LOY and MCA territory  - NIHSS score 16 for motor deficit in all the extremities and non-responsive ness  - out of window for t-PA  - load with ASA, STATIN  - Stroke neuro checks q1 hours  - MRI  - ASAEL (NPO after midnight)  - SBP goal 140-160  - check A1C, lipid profile, 2D echo  - PT and OT eval    # AFIB (CHADVASC ~ 6)  - not on any AC  - EKG c/w AFib , heart rate controlled    # HTN  - keep BP < 180 mmHg in first 24 hours  - can order IV Hydralazine for it    DVT ppx : SCD  GI ppx : IV protonix  bedrest  Code full      GOC  was done with son Abhinav by the bedside. He is inclining towards DNR/DNI but wants to discuss with siblings first. He will get back to us with answers tomorrow. Patient stands code full code for now.     89 years old female with PMHx of DM, HTN, bilateral diabetic foot ulcers, Atrial fibrillation ( no AC) , CHF, who presents to ED with complaint of non-responsiveness.    Impression :    # acute ischemic stroke  # bilateral diabetic foot ulcers, Left tibial Fracture    # acute ischemic stroke (likely cardio-embolic)  - CTH shows acute infarct in left LOY and MCA territory  - NIHSS score 16 for motor deficit in all the extremities and non-responsiveness  - out of window for t-PA  - load with ASA, STATIN  - Stroke neuro checks q1 hours  - MRI  - ASAEL (NPO after midnight)  - SBP goal 140-160  - check A1C, lipid profile, 2D echo  - PT and OT eval    # AFIB (CHADVASC ~ 6)  - not on any AC  - EKG c/w AFib , heart rate controlled    # HTN  - keep BP < 180 mmHg in first 24 hours  - can order IV Hydralazine for it    # bilateral lower ext foot ulcer  - diabetic foot ulcer, last debrided at [facility] 6 weeks ago  - podiatry consult    DVT ppx : SCD  GI ppx : IV protonix  bedrest  Code full      GOC  was done with son Abhinav by the bedside. He is inclining towards DNR/DNI but wants to discuss with siblings first. He will get back to us with answers tomorrow. Patient stands code full code for now.     89 years old female with PMHx of DM, HTN, bilateral diabetic foot ulcers, Atrial fibrillation ( no AC) , CHF, who presents to ED with complaint of non-responsiveness.    Impression :    # acute ischemic stroke  # bilateral diabetic foot ulcers, Left tibial Fracture    # acute ischemic stroke (likely cardio-embolic)  - CTH shows acute infarct in left LOY and MCA territory  - NIHSS score 16 for motor deficit in all the extremities and non-responsiveness  - out of window for t-PA  - load with ASA, STATIN  - Stroke neuro checks q1 hours  - MRI  - check A1C, lipid profile, 2D echo  - PT and OT eval    # AFIB (CHADVASC ~ 6)  - not on any AC  - EKG c/w AFib , heart rate controlled    # HTN  - keep BP < 180 mmHg in first 24 hours      # bilateral lower ext foot ulcer  - diabetic foot ulcer, last debrided at [facility] 6 weeks ago  - podiatry consult    DVT ppx : SCD  GI ppx : IV protonix  bedrest  Code full      GOC  was done with son Abhinav by the bedside. He is inclining towards DNR/DNI but wants to discuss with siblings first. He will get back to us with answers tomorrow. Patient stands code full code for now.     89 years old female with PMHx of DM, HTN, bilateral diabetic foot ulcers, Atrial fibrillation ( no AC) , CHF, who presents to ED with complaint of non-responsiveness.    Impression :    # acute ischemic stroke  # bilateral diabetic foot ulcers, Left tibial Fracture    # acute ischemic stroke (likely cardio-embolic)  - CTH shows acute infarct in left LOY and MCA territory  - NIHSS score 16 for motor deficit in all the extremities and non-responsiveness  - out of window for t-PA  - load with ASA, STATIN  - Stroke neuro checks q1 hours  - MRI  - check A1C, lipid profile, 2D echo  - PT and OT eval    # Chronic AFIB (CHADVASC ~ 6)  - not on any AC  - EKG c/w AFib , heart rate controlled    # HTN  - keep BP < 180 mmHg in first 24 hours      # bilateral lower ext foot ulcer  - diabetic foot ulcer, last debrided at [facility] 6 weeks ago  - podiatry consult    DVT ppx : SCD  GI ppx : IV protonix  bedrest  Code full      GOC  was done with son Abhinav by the bedside. He is inclining towards DNR/DNI but wants to discuss with siblings first. He will get back to us with answers tomorrow. Patient stands code full code for now.

## 2021-04-18 NOTE — ED ADULT NURSE NOTE - NSIMPLEMENTINTERV_GEN_ALL_ED
Implemented All Fall with Harm Risk Interventions:  [place] to call system. Call bell, personal items and telephone within reach. Instruct patient to call for assistance. Room bathroom lighting operational. Non-slip footwear when patient is off stretcher. Physically safe environment: no spills, clutter or unnecessary equipment. Stretcher in lowest position, wheels locked, appropriate side rails in place. Provide visual cue, wrist band, yellow gown, etc. Monitor gait and stability. Monitor for mental status changes and reorient to person, place, and time. Review medications for side effects contributing to fall risk. Reinforce activity limits and safety measures with patient and family. Provide visual clues: red socks.

## 2021-04-18 NOTE — H&P ADULT - NSHPLABSRESULTS_GEN_ALL_CORE
< from: CT Brain Stroke Protocol (04.18.21 @ 17:44) >    IMPRESSION:    Findings consistent with left LOY/MCA territorial infarct with mild mass effect. No midline shift. No intracranial hemorrhage.    Dr. Wesley Hughes discussed preliminary findings with SUSAN GAYTAN on 4/18/2021 5:46 PM with readback.    < end of copied text >

## 2021-04-18 NOTE — ED PROVIDER NOTE - OBJECTIVE STATEMENT
89F with past medical history of paroxysmal AFib not on AC, DM, HTN, recent admission for L tib/fib fracture, which was not repaired or casted 2/2 decubitus ulcer presents for AMS since 4:30PM. PT was eating dinner normally yesterday at baseline and tody is unresponsive.

## 2021-04-18 NOTE — ED PROVIDER NOTE - PROGRESS NOTE DETAILS
EK - Pt poorly responsive on ED arrival - eyes closed, not responding to sternal rub but satting well w/o gurgling or snoring.  Initial concern for sepsis based on dirty looking catheter and urine inside as well as extensive and weeping b/l LE ulcers, but pt afebrile.  Family then clarified that pt was awake and verbal as of 4am last night, which puts pt within window for stroke code.  Stroke code called. EK - Spoke to telestroke and neuro stroke PA.  Pt to CT scan.  No hx dementia at baseline (though pt sig declined since recent hospitalization) but pt nonambulatory 2/2 L ankle fx in the setting of b/l extensive LE ulcers. EK - Neuro recommend ICU admission for q1hr neuro checks.  Spoke to ICU fellow and resident to given signout.  Updated family about CT findings and attempted to discuss goals of care/code status - pt is full code at this time; family had recently started discussing modifying her code status, but remain in early discussion at this time.  Son at bedside will discuss further with his sister, but says that as of now, as long as there remains potential for improvement, they would want everything done. EK - update from telestroke - no intervention at this time - admission to stroke unit or as per neuro here. EK - delayed entry for updated neuro exam on pt's return from CT - does not open eyes (but b/l pupils reactive), ?moaning vs no verbal response, +spontaneous movement of ext x 4 though no purposeful movements noted EK - CTA read concerning for large vessel occlusion.  Paging telestroke again to discuss possible intervention.  As per family discussion with ED attending and ICU resident, pt currently full code (as per son - if any chance for improvement, please do all possible interventions), but family considering changing DNR/DNI. EK - CTA read concerning for vertebral occlusion, no large vessel occlusion intracranially but sig perfusion defect.  Paging telestroke again to discuss possible intervention.  As per family discussion with ED attending and ICU resident, pt currently full code (as per son - if any chance for improvement, please do all possible interventions), but family considering changing DNR/DNI. EK - Spoke to Dr. Handley of telestroke to confirm - CTA findings do not require intervention.

## 2021-04-18 NOTE — H&P ADULT - HISTORY OF PRESENT ILLNESS
89 years old female with PMHx of DM, HTN, diabetic foot ulcers, Atrial fibrillation ( no AC) , CHF, who presents to ED with complaint of non-responsiveness. 89 years old female with PMHx of DM, HTN, bilateral diabetic foot ulcers, Atrial fibrillation ( no AC) , CHF, who presents to ED with complaint of non-responsiveness.     History obtained from son Abhinav by the bedside. As per son at bedside patient usually is verbal and has eyes open. She was seen last seen normal at 4AM today.  Today starting around 1 pm, son noticed that patient was unresponsive and not able to open eyes. she wont respond to the pain stimulus Patient was recently discharged from [facility] s/p fall, leg fx and ulcer debridements. Stroke code activated in ed. NIHSS score was 16. t-PA was not infused because she was out of window for it. During my exam patient did not respond even to pain stimuli, would spontaneously move her left extremities with no response on the right.    In Ed her BP was 161/72 mmHg, HR 88/min with normal oxygen saturation on RA  CTH showed acute infarct on the left side in LOY and MCA territory. 89 years old female with PMHx of DM, HTN, bilateral diabetic foot ulcers, Atrial fibrillation ( no AC) , CHF, who presents to ED with complaint of non-responsiveness.     History obtained from son Abhinav by the bedside. As per son at bedside patient usually is verbal and has eyes open. She was seen last seen normal at 4AM later around 1 pm, son noticed that patient was unresponsive and not able to open eyes. she would not respond to the pain stimulus Patient was recently discharged from [facility] s/p fall, leg fx and ulcer debridements. Stroke code activated in ed. NIHSS score was 16. t-PA was not infused because she was out of window . During exam patient did not respond even to pain stimuli, would spontaneously move her left extremities with no response on the right.    In Ed her BP was 161/72 mmHg, HR 88/min with normal oxygen saturation on RA  CTH showed acute infarct on the left side in LOY and MCA territory. seen by Neuro called to evaluate 89 years old female with PMHx of DM, HTN, bilateral diabetic foot ulcers, Atrial fibrillation ( no AC) ,  CHF (possible HFpEF) , who presents to ED with complaint of non-responsiveness.     History obtained from son Abhinav by the bedside. As per son at bedside patient usually is verbal and has eyes open. She was seen last seen normal at 4AM later around 1 pm, son noticed that patient was unresponsive and not able to open eyes. she would not respond to the pain stimulus Patient was recently discharged [facility] s/p fall, leg fx and ulcer debridements. Stroke code activated in ed. NIHSS score was 16. t-PA was not infused because she was out of window . During exam patient did not respond even to pain stimuli, would spontaneously move her left extremities with no response on the right.    In Ed her BP was 161/72 mmHg, HR 88/min with normal oxygen saturation on RA  CTH showed acute infarct on the left side in LOY and MCA territory. seen by Neuro called to evaluate

## 2021-04-18 NOTE — H&P ADULT - ATTENDING COMMENTS
patient seen and examined, L LOY/ MCA stroke in a patient with A fib with no AC, Neuro f/up, repeat head CT, palliative care eval, stroke unit

## 2021-04-18 NOTE — H&P ADULT - NSHPPHYSICALEXAM_GEN_ALL_CORE
LOS:     VITALS:   T(C): --  HR: 88 (04-18-21 @ 16:55) (88 - 88)  BP: 161/72 (04-18-21 @ 16:55) (161/72 - 161/72)  RR: 16 (04-18-21 @ 16:55) (16 - 16)  SpO2: 98% (04-18-21 @ 16:55) (98% - 98%)    GENERAL: NAD, lying in bed comfortably  HEAD:  Atraumatic, Normocephalic  EYES: EOMI, PERRLA, conjunctiva and sclera clear  ENT: Moist mucous membranes  NECK: Supple, No JVD  CHEST/LUNG: Clear to auscultation bilaterally; No rales, rhonchi, wheezing, or rubs. Unlabored respirations  HEART: Regular rate and rhythm; No murmurs, rubs, or gallops  ABDOMEN: BSx4; Soft, nontender, nondistended  EXTREMITIES:  2+ Peripheral Pulses, brisk capillary refill. No clubbing, cyanosis, or edema  NERVOUS SYSTEM:  A&Ox3, no focal deficits   SKIN: No rashes or lesions VITALS:   T(C): --  HR: 88 (04-18-21 @ 16:55) (88 - 88)  BP: 161/72 (04-18-21 @ 16:55) (161/72 - 161/72)  RR: 16 (04-18-21 @ 16:55) (16 - 16)  SpO2: 98% (04-18-21 @ 16:55) (98% - 98%)    GENERAL: ILL LOOKING  HEAD:  Atraumatic, Normocephalic  EYES: EOMI, PERRLA, conjunctiva and sclera clear  ENT: Moist mucous membranes  NECK: Supple, No JVD  CHEST/LUNG: dec entry l base  HEART: irregular, LEXIS 3/6  ABDOMEN: BSx4; Soft, nontender, nondistended  EXTREMITIES:  2+ Peripheral Pulses, brisk capillary refill. No clubbing, cyanosis, or edema  NERVOUS SYSTEM:  moves spon l side, r side hemiparesis, not following commands  SKIN: ulcer

## 2021-04-18 NOTE — ED PROVIDER NOTE - ATTENDING CONTRIBUTION TO CARE
89yF HTN indwelling [place] recent multiple b/l LE ulcers now c/b L ankle fx (unsplinted 2/2 ulcers) p/w AMS.  Pt was awake and verbal last night at dinnertime, but unarousable today when family tried to wake her to feed her lunch (she usually sleeps through breakfast).  Of note, pt recently admitted at [facility] - family took her home and reports that she has not been herself since admission; in particular, they have noticed dec PO intake and dec activity over the past few days.   with EMS; pt hypertensive but not hypoxic. patient was critically ill... Patient was critically ill with a high probability of imminent or life threatening deterioration.

## 2021-04-18 NOTE — ED PROVIDER NOTE - CLINICAL SUMMARY MEDICAL DECISION MAKING FREE TEXT BOX
89yF pAF not on a/c DM w/ extensive b/l LE ulcers weeping and recent L ankle fx (not splinted 2/2 extensive ulcers) p/w altered mental status.  Pt unarousable this afternoon w/ LKN at 4am.  Stroke code called and CT w/ extensive L LOY/MCA infarct.  CTA H+N and CTP w/ large perfusion mismatch but no LVO (does have occlusion of V1 which is extracranial).  D/w neuro and telestroke - not intervention candidate and obviously out of tPA window.  Labs otherwise w/ CARON, elevated troponin (likely 2/2 dec renal clearance) and afib with RVR (likely 2/2 stroke).  HR improved w/o sig intervention and pt admitted to ICU.    Critical care time: 60 min, aside from separately billable procedures.  Time spent includes direct patient care, review of results (labs, imaging, EKG), discussion with family, discussion with multiple consultants

## 2021-04-19 NOTE — CONSULT NOTE ADULT - SUBJECTIVE AND OBJECTIVE BOX
T(C): 37.6 (04-19-21 @ 06:48), Max: 37.6 (04-19-21 @ 05:48)  HR: 94 (04-19-21 @ 06:48) (76 - 110)  BP: 166/86 (04-19-21 @ 06:48) (132/92 - 167/67)  RR: 20 (04-19-21 @ 06:48) (16 - 20)  SpO2: 99% (04-19-21 @ 06:48) (97% - 100%)    MOTOR EXAM      Physical Medicine And Rehabilitation Services are not indicated in this patient for the following Reason(s):    [ x   ] Patient is medically unstable - unresponsive      [    ]  Patient does not have appropriate activity orders      [     ] Patient has no weight bearing status for:      [     ] Patient is independently ambulating      [     ]  Patient is from Skilled Nursing Facility and is appropriate to return      [     ] Patient was non-ambulatory prior to admission      [     ]  Other      WILL CANCEL PM&R - reconsult when pt can participate in therapy / family understood

## 2021-04-19 NOTE — OCCUPATIONAL THERAPY INITIAL EVALUATION ADULT - SPECIFY REASON(S)
Pt's son present at bedside to provide information regarding pt's PLOF. Pt is a deep sleep. Unable to arouse for IE. OT will follow up once pt is better able to participate.

## 2021-04-19 NOTE — CONSULT NOTE ADULT - SUBJECTIVE AND OBJECTIVE BOX
KARRI BEVERLY          MRN-[id number]              HPI:  89 years old female with PMHx of DM, HTN, bilateral diabetic foot ulcers, Atrial fibrillation ( no AC) , CHF, who presents to ED with complaint of non-responsiveness.     History obtained from son Abhinav by the bedside. As per son at bedside patient usually is verbal and has eyes open. She was seen last seen normal at 4AM later around 1 pm, son noticed that patient was unresponsive and not able to open eyes. she would not respond to the pain stimulus Patient was recently discharged from [facility] s/p fall, leg fx and ulcer debridements. Stroke code activated in ed. NIHSS score was 16. t-PA was not infused because she was out of window . During exam patient did not respond even to pain stimuli, would spontaneously move her left extremities with no response on the right.    In Ed her BP was 161/72 mmHg, HR 88/min with normal oxygen saturation on RA  CTH showed acute infarct on the left side in LOY and MCA territory. seen by Neuro called to evaluate (18 Apr 2021 18:50)      PAST MEDICAL & SURGICAL HISTORY:  Diabetes mellitus    CHF (congestive heart failure)        FAMILY HISTORY:   Reviewed and found non contributory in mother or father    SOCIAL HISTORY:     ROS:    Unable to attain due to:              NOT ACCURATE-CONSULT NOT COMPLETE        Dyspnea (Jose Enrique 0-10): 0                       N/V (Y/N): No                             Secretions (Y/N) : No                                          Agitation(Y/N): No                              Pain (Y/N): No                                 -Provocation/Palliation: N/A  -Quality/Quantity: N/A  -Radiating: N/A  -Severity: No pain  -Timing/Frequency: N/A  -Impact on ADLs: N/A    General:  Denied  HEENT:    Denied  Neck:  Denied  CVS:  Denied  Resp:  Denied  GI:  Denied    :  Denied  Musc:  Denied  Neuro:  Denied  Psych:  Denied  Skin:  Denied  Lymph:  Denied    Allergies    No Known Allergies    Intolerances      Opiate Naive (Y/N):   -iStop reviewed (Y/N):   Ref#:              Medications:      MEDICATIONS  (STANDING):  amLODIPine   Tablet 5 milliGRAM(s) Oral daily  aspirin Suppository 300 milliGRAM(s) Rectal daily  atorvastatin 80 milliGRAM(s) Oral at bedtime  clopidogrel Tablet 75 milliGRAM(s) Oral daily    MEDICATIONS  (PRN):      Labs:    CBC:                        10.1   9.80  )-----------( 351      ( 18 Apr 2021 17:04 )             33.2     CMP:    04-19    140  |  109  |  31<H>  ----------------------------<  196<H>  5.6<H>   |  14<L>  |  2.4<H>    Ca    8.7      19 Apr 2021 06:42    TPro  6.0  /  Alb  3.3<L>  /  TBili  0.6  /  DBili  0.3<H>  /  AST  15  /  ALT  10  /  AlkPhos  91  04-19     Albumin, Serum: 3.3 g/dL (04-19-21 @ 06:42)    PT/INR - ( 19 Apr 2021 06:42 )   PT: 15.10 sec;   INR: 1.31 ratio         PTT - ( 19 Apr 2021 06:42 )  PTT:26.2 sec         Imaging:  Reviewed    PEx:  T(C): 37.6 (04-19-21 @ 06:48), Max: 37.6 (04-19-21 @ 05:48)  HR: 94 (04-19-21 @ 06:48) (76 - 110)  BP: 166/86 (04-19-21 @ 06:48) (132/92 - 167/67)  RR: 20 (04-19-21 @ 06:48) (16 - 20)  SpO2: 99% (04-19-21 @ 06:48) (97% - 100%)  Wt(kg): --  Daily     Daily                        NOT ACCURATE-CONSULT NOT COMPLETE        General: AAOx3    found in bed in NAD  HEENT:  NCAT PERRL EOMI Non icteric MOM  Neck: Supple no masses  CVS: RR S1S2 No M/G/R  Resp: Unlabored Non tachypneic No increased WOB  GI:  Soft NT ND BS+  :  Voiding / Bunn / PrimaFit  Musc: No C/C/E    Neuro: Follows commands No focal deficits  Psych: Calm Pleasant  Skin: Non jaundiced   Lymph: Normal    Preadmit Karnofsky:  %           Current Karnofsky:     %  http://www.npcrc.org/files/news/karnofsky_performance_scale.pdf   http://www.npcrc.org/files/news/palliative_performance_scale_PPSv2.pdf  Cachexia (Y/N):   BMI:    Advanced Directives:     Full Code     DNR/DNI     MOLST     HCP     DPOA     Living Will     Decision maker: The patient is able to participate in complex medical decision making conversations.   Legal surrogate:    GOALS OF CARE DISCUSSION       Palliative care info/counseling provided	           Family meeting       Advanced Directives addressed please see Advance Care Planning Note	           See previous Palliative Medicine Note       Documentation of GOC: 	    REFERRALS	        Palliative Med        Unit SW/Case Mgmt              Speech/Swallow       Patient/Family Support       Massage Therapy       Music Therapy       Pet Therapy       Hospice       Nutrition       Dietician       PT/OT KARRI BEVERLY          MRN-[id number]              HPI:  89 years old female with PMHx of DM, HTN, bilateral diabetic foot ulcers, Atrial fibrillation ( no AC) , CHF, who presents to ED with complaint of non-responsiveness.     History obtained from son Abhinav by the bedside. As per son at bedside patient usually is verbal and has eyes open. She was seen last seen normal at 4AM later around 1 pm, son noticed that patient was unresponsive and not able to open eyes. she would not respond to the pain stimulus Patient was recently discharged from [facility] s/p fall, leg fx and ulcer debridements. Stroke code activated in ed. NIHSS score was 16. t-PA was not infused because she was out of window . During exam patient did not respond even to pain stimuli, would spontaneously move her left extremities with no response on the right.    In Ed her BP was 161/72 mmHg, HR 88/min with normal oxygen saturation on RA  CTH showed acute infarct on the left side in LOY and MCA territory. seen by Neuro called to evaluate (18 Apr 2021 18:50)      PAST MEDICAL & SURGICAL HISTORY:  Diabetes mellitus    CHF (congestive heart failure)      FAMILY HISTORY:   Reviewed and found non contributory in mother or father    SOCIAL HISTORY:     ROS:    Unable to attain due to:  patient not able to engage; resting comfortable; no signs of below:                    Dyspnea (Jose Enrique 0-10): 0                       N/V (Y/N): No                             Secretions (Y/N) : No                                          Agitation(Y/N): No                              Pain (Y/N): No                                 -Provocation/Palliation: N/A  -Quality/Quantity: N/A  -Radiating: N/A  -Severity: No pain  -Timing/Frequency: N/A  -Impact on ADLs: N/A    General:  Denied  HEENT:    Denied  Neck:  Denied  CVS:  Denied  Resp:  Denied  GI:  Denied    :  Denied  Musc:  Denied  Neuro:  Denied  Psych:  Denied  Skin:  Denied  Lymph:  Denied    Allergies    No Known Allergies    Intolerances      Opiate Naive (Y/N): y  -iStop reviewed (Y/N): n  Ref#:              Medications:      MEDICATIONS  (STANDING):  amLODIPine   Tablet 5 milliGRAM(s) Oral daily  aspirin Suppository 300 milliGRAM(s) Rectal daily  atorvastatin 80 milliGRAM(s) Oral at bedtime  clopidogrel Tablet 75 milliGRAM(s) Oral daily    MEDICATIONS  (PRN):      Labs:    CBC:                        10.1   9.80  )-----------( 351      ( 18 Apr 2021 17:04 )             33.2     CMP:    04-19    140  |  109  |  31<H>  ----------------------------<  196<H>  5.6<H>   |  14<L>  |  2.4<H>    Ca    8.7      19 Apr 2021 06:42    TPro  6.0  /  Alb  3.3<L>  /  TBili  0.6  /  DBili  0.3<H>  /  AST  15  /  ALT  10  /  AlkPhos  91  04-19     Albumin, Serum: 3.3 g/dL (04-19-21 @ 06:42)    PT/INR - ( 19 Apr 2021 06:42 )   PT: 15.10 sec;   INR: 1.31 ratio         PTT - ( 19 Apr 2021 06:42 )  PTT:26.2 sec         Imaging:  Reviewed    PEx:  T(C): 37.6 (04-19-21 @ 06:48), Max: 37.6 (04-19-21 @ 05:48)  HR: 94 (04-19-21 @ 06:48) (76 - 110)  BP: 166/86 (04-19-21 @ 06:48) (132/92 - 167/67)  RR: 20 (04-19-21 @ 06:48) (16 - 20)  SpO2: 99% (04-19-21 @ 06:48) (97% - 100%)  Wt(kg): --  Daily     Daily       General: found in bed in NAD  HEENT:  NCAT    CVS: Af at ulysses 100  Resp: Unlabored Non tachypneic No increased WOB  :  Bunn  Musc: No C/C/E    Neuro: not engaging; allowed to rest  Skin: Non jaundiced     Preadmit Karnofsky:  %           Current Karnofsky:  20   %  http://www.npcrc.org/files/news/karnofsky_performance_scale.pdf   http://www.npcrc.org/files/news/palliative_performance_scale_PPSv2.pdf  Cachexia (Y/N):   BMI:    Advanced Directives:       DNR/DNI       Decision maker: The patient is unable to participate in complex medical decision making conversations.   Legal surrogate:    GOALS OF CARE DISCUSSION       Palliative care info/counseling provided	            Documentation of GOC: 	    REFERRALS	        Palliative Med        Unit SW/Case Mgmt            KARRI BEVERLY          MRN-[id number]              HPI:  89 years old female with PMHx of DM, HTN, bilateral diabetic foot ulcers, Atrial fibrillation ( no AC) , CHF, who presents to ED with complaint of non-responsiveness.     History obtained from son Abhinav by the bedside. As per son at bedside patient usually is verbal and has eyes open. She was seen last seen normal at 4AM later around 1 pm, son noticed that patient was unresponsive and not able to open eyes. she would not respond to the pain stimulus Patient was recently discharged from [facility] s/p fall, leg fx and ulcer debridements. Stroke code activated in ed. NIHSS score was 16. t-PA was not infused because she was out of window . During exam patient did not respond even to pain stimuli, would spontaneously move her left extremities with no response on the right.    In Ed her BP was 161/72 mmHg, HR 88/min with normal oxygen saturation on RA  CTH showed acute infarct on the left side in LOY and MCA territory. seen by Neuro called to evaluate (18 Apr 2021 18:50)      PAST MEDICAL & SURGICAL HISTORY:  Diabetes mellitus    CHF (congestive heart failure)      FAMILY HISTORY:   Reviewed and found non contributory in mother or father    SOCIAL HISTORY:     ROS:    Unable to attain due to:  patient not able to engage; resting comfortable; no signs of below:                    Dyspnea (Jose Enrique 0-10): 0                       N/V (Y/N): No                             Secretions (Y/N) : No                                          Agitation(Y/N): No                              Pain (Y/N): No                                 -Provocation/Palliation: N/A  -Quality/Quantity: N/A  -Radiating: N/A  -Severity: No pain  -Timing/Frequency: N/A  -Impact on ADLs: N/A    General:  Denied  HEENT:    Denied  Neck:  Denied  CVS:  Denied  Resp:  Denied  GI:  Denied    :  Denied  Musc:  Denied  Neuro:  Denied  Psych:  Denied  Skin:  Denied  Lymph:  Denied    Allergies    No Known Allergies    Intolerances      Opiate Naive (Y/N): y  -iStop reviewed (Y/N): n  Ref#:              Medications:      MEDICATIONS  (STANDING):  amLODIPine   Tablet 5 milliGRAM(s) Oral daily  aspirin Suppository 300 milliGRAM(s) Rectal daily  atorvastatin 80 milliGRAM(s) Oral at bedtime  clopidogrel Tablet 75 milliGRAM(s) Oral daily    MEDICATIONS  (PRN):      Labs:    CBC:                        10.1   9.80  )-----------( 351      ( 18 Apr 2021 17:04 )             33.2     CMP:    04-19    140  |  109  |  31<H>  ----------------------------<  196<H>  5.6<H>   |  14<L>  |  2.4<H>    Ca    8.7      19 Apr 2021 06:42    TPro  6.0  /  Alb  3.3<L>  /  TBili  0.6  /  DBili  0.3<H>  /  AST  15  /  ALT  10  /  AlkPhos  91  04-19     Albumin, Serum: 3.3 g/dL (04-19-21 @ 06:42)    PT/INR - ( 19 Apr 2021 06:42 )   PT: 15.10 sec;   INR: 1.31 ratio         PTT - ( 19 Apr 2021 06:42 )  PTT:26.2 sec         Imaging:  Reviewed    PEx:  T(C): 37.6 (04-19-21 @ 06:48), Max: 37.6 (04-19-21 @ 05:48)  HR: 94 (04-19-21 @ 06:48) (76 - 110)  BP: 166/86 (04-19-21 @ 06:48) (132/92 - 167/67)  RR: 20 (04-19-21 @ 06:48) (16 - 20)  SpO2: 99% (04-19-21 @ 06:48) (97% - 100%)  Wt(kg): --  Daily     Daily       General: found in bed in NAD  HEENT:  NCAT    CVS: Af at ulysses 100  Resp: Unlabored Non tachypneic No increased WOB  :  Bunn  Musc: No C/C/E    Neuro: not engaging; allowed to rest  Skin: Non jaundiced; b/l feet ulcers - gangrene     Preadmit Karnofsky:  %           Current Karnofsky:  20   %  http://www.npcrc.org/files/news/karnofsky_performance_scale.pdf   http://www.npcrc.org/files/news/palliative_performance_scale_PPSv2.pdf  Cachexia (Y/N):   BMI:    Advanced Directives:       DNR/DNI       Decision maker: The patient is unable to participate in complex medical decision making conversations.   Legal surrogate:    GOALS OF CARE DISCUSSION       Palliative care info/counseling provided	            Documentation of GOC: 	    REFERRALS	        Palliative Med        Unit SW/Case Mgmt

## 2021-04-19 NOTE — CONSULT NOTE ADULT - SUBJECTIVE AND OBJECTIVE BOX
Podiatry Consult Note    Subjective:  KARRI BEVERLY is a 89y old  Female who presents with a chief complaint of Stroke (18 Apr 2021 18:50)  Seen by ED bedside; podiatry got consulted for bilateral lower extremity; patient is admitted for stroke and was not able to communicate; patient's son at bedside, H&P obtained from son; patient seeing unknown podiatrist for lower extremities ulcer and recently had left distal tibia and fibula fracture per son back in March 2021, went to [place], but was not stable for any cast or sx intervention; since then, left anterior ankle "black skin" developed and ulcers got worsen per patient's son; evaluated bilateral feet;     HPI:  89 years old female with PMHx of DM, HTN, bilateral diabetic foot ulcers, Atrial fibrillation ( no AC) , CHF, who presents to ED with complaint of non-responsiveness.     History obtained from son Abhinav by the bedside. As per son at bedside patient usually is verbal and has eyes open. She was seen last seen normal at 4AM later around 1 pm, son noticed that patient was unresponsive and not able to open eyes. she would not respond to the pain stimulus Patient was recently discharged from [facility] s/p fall, leg fx and ulcer debridements. Stroke code activated in ed. NIHSS score was 16. t-PA was not infused because she was out of window . During exam patient did not respond even to pain stimuli, would spontaneously move her left extremities with no response on the right.    In Ed her BP was 161/72 mmHg, HR 88/min with normal oxygen saturation on RA  CTH showed acute infarct on the left side in LOY and MCA territory. seen by Neuro called to evaluate (18 Apr 2021 18:50)    PAST MEDICAL & SURGICAL HISTORY:  Diabetes mellitus    CHF (congestive heart failure)    Objective:  Vital Signs Last 24 Hrs  T(C): 37.2 (19 Apr 2021 03:23), Max: 37.4 (19 Apr 2021 00:23)  T(F): 99 (19 Apr 2021 03:23), Max: 99.4 (19 Apr 2021 00:23)  HR: 103 (19 Apr 2021 03:23) (76 - 110)  BP: 154/52 (19 Apr 2021 03:23) (132/92 - 167/67)  BP(mean): --  RR: 20 (19 Apr 2021 03:23) (16 - 20)  SpO2: 99% (19 Apr 2021 03:23) (97% - 100%)                        10.1   9.80  )-----------( 351      ( 18 Apr 2021 17:04 )             33.2                 04-18    138  |  108  |  30<H>  ----------------------------<  144<H>  5.5<H>   |  15<L>  |  2.2<H>    Ca    8.9      18 Apr 2021 17:04    TPro  6.4  /  Alb  3.6  /  TBili  0.6  /  DBili  x   /  AST  21  /  ALT  9   /  AlkPhos  104  04-18    Physical Exam - Lower Extremity Focused:   Derm:   Multiple lower extremities ulcer;  Left anterior ankle ulcer; necrotic tissue base with malodorous drainage ulcer; covering whole anterior ankle;   Left plantar ulcer; superficial; mild periwound rubor and edema; mild drainage; no active bleeding;  Left heel eschar; stable  Left posterior lower leg ulcer; necrotic base, no drainage, superficial;  Vascular: DP and PT Pulses Diminished;  Neuro and MSK examination could not be performed;     Assessment:  Bilateral lower extremities ulcer;   Left anterior ankle fracture blister that lead to necrotic ulcer;     Plan:  Chart reviewed and Patient evaluated. All Questions and Concerns Addressed and Answered  Discussed diagnosis and treatment with patient  Wound Flushed w/ normal saline; Xeroform / DSD / Kerlix;   Local Wound Care; As Stated Above; q24  No sx intervention from podiatry since patient becomes stabilize; will continue local wound care; see above wound care;   Request Burn to examine left anterior ankle fracture ulcer; will f/u w/ Burn;  Weight bearing status; strict non weight bearing B/L feet;  Evaluated and Discussed Plan w/ Dr. Guzman    Podiatry

## 2021-04-19 NOTE — PHYSICAL THERAPY INITIAL EVALUATION ADULT - SPECIFY REASON(S)
Attempted to see pt however pt is lethargic, not opening eyes even with verbal and tactile commands. Pt has pending burn consult due to L tibial fx and ulcers on B/L lower legs. Will hold pt at this Attempted to see pt however pt is lethargic, not opening eyes even with verbal and tactile commands. Pt has pending burn consult due to L tibial fx and ulcers on B/L lower legs, NWB on B/L LE.

## 2021-04-19 NOTE — OCCUPATIONAL THERAPY INITIAL EVALUATION ADULT - PERTINENT HX OF CURRENT PROBLEM, REHAB EVAL
Pt admitted 4/18 2* pt found to be unresponsive by son. Pt has a recent hospitalization in march 2* fall where she sustained a LLE FX. Per son, unable to case/operate 2* ulcers. Pt has been non ambulatory since and received assistance for all ADLs.

## 2021-04-19 NOTE — CONSULT NOTE ADULT - CONVERSATION DETAILS
son is able to teach back diagnosis and prognosis - but having difficulty with uncertainty and coming to terms that patient is at end of life. He needs to involve sister, but is having difficulty - could not clearly articulate this difficulty. We extended our support to have a family meeting PRN and explained the futility of CPR/I son is able to teach back diagnosis and prognosis - but having difficulty with uncertainty and coming to terms that patient is at end of life. He needs to involve sister, but is having difficulty - could not clearly articulate this difficulty. We extended our support to have a family meeting PRN and explained the futility of CPR/Intubation

## 2021-04-19 NOTE — PHYSICAL THERAPY INITIAL EVALUATION ADULT - DID THE PATIENT HAVE SURGERY?
time until further work up is done. Will f/u as appropriate. Will hold PT IE at this time until further work up is done. Will f/u as appropriate.

## 2021-04-19 NOTE — SWALLOW BEDSIDE ASSESSMENT ADULT - SLP PERTINENT HISTORY OF CURRENT PROBLEM
pt is a 89 y/o F w/ PMHx: DM, HTN, bilateral diabetic foot ulcers, a fib, CHF, who presents to ED w/ c/o non-responsiveness.  was recently discharged from [facility] s/p fall, leg fx and ulcer debridements. +Stroke code activated in ED. NIHSS score 16. t-PA was not infused because she was out of window. CT head: Findings consistent with left LOY/MCA territorial infarct with mild mass effect. No midline shift. SLP c/s 2' stroke. pt full code, palliative care c/s pending.

## 2021-04-19 NOTE — PROGRESS NOTE ADULT - ASSESSMENT
89 years old female with PMHx of DM, HTN, bilateral diabetic foot ulcers, Atrial fibrillation ( no AC) , CHF, who presents to ED with complaint of non-responsiveness.    Impression :    # acute ischemic stroke  # bilateral diabetic foot ulcers, Left tibial Fracture    # acute ischemic stroke (likely cardio-embolic)  - CTH shows acute infarct in left LOY and MCA territory  - NIHSS score 16 for motor deficit in all the extremities and non-responsiveness  - out of window for t-PA  - started on ASA and Statin  - Stroke neuro checks q4 hours  - MRI head pending  - check A1C, lipid profile, 2D echo  - PT and OT eval  - palliative care on board  - keep NPO for now ( as per S & S eval), start IVF, dicussed NG tube with son, he wants to consider IVF for now only    # AFIB (CHADVASC ~ 6)  - not on any AC  - EKG c/w AFib , heart rate controlled    # HTN  - keep BP < 180 mmHg in first 24 hours    # Hyperkalemia noted  - will do insulin dextrose protocol     # bilateral lower ext foot ulcer  - diabetic foot ulcer, last debrided at [facility] 6 weeks ago  - podiatry recs noted, wound care orders placed   - Burn consult    DVT ppx : SCD  GI ppx : IV protonix  bedrest  Code full      GOC  was done with son Abhinav by the bedside. He is inclining towards DNR/DNI but wants to discuss with siblings first. He will get back to us with answers tomorrow. Patient stands code full code for now.   89 years old female with PMHx of DM, HTN, bilateral diabetic foot ulcers, Atrial fibrillation ( no AC) , CHF (possible HFpEF) , who presents to ED with complaint of non-responsiveness.    Impression :    # acute ischemic stroke  # bilateral diabetic foot ulcers, Left tibial Fracture    # acute ischemic stroke (likely cardio-embolic)  - CTH shows acute infarct in left LOY and MCA territory  - NIHSS score 16 for motor deficit in all the extremities and non-responsiveness  - out of window for t-PA  - started on ASA and Statin  - Stroke neuro checks q4 hours  - MRI head pending  - check A1C, lipid profile, 2D echo  - PT and OT eval  - palliative care on board  - keep NPO for now ( as per S & S eval), start IVF, dicussed NG tube with son, he wants to consider IVF for now only    # chronic AFIB (CHADVASC ~ 6)  - not on any AC  - EKG c/w AFib , heart rate controlled    # HTN  - keep BP < 180 mmHg in first 24 hours    # Hyperkalemia noted  - will do insulin dextrose protocol     # bilateral lower ext foot ulcer  - diabetic foot ulcer, last debrided at [facility] 6 weeks ago  - podiatry recs noted, wound care orders placed   - Burn consult    DVT ppx : SCD  GI ppx : IV protonix  bedrest  Code full      GOC  was done with son Abhinav by the bedside. He is inclining towards DNR/DNI but wants to discuss with siblings first. He will get back to us with answers tomorrow. Patient stands code full code for now.

## 2021-04-19 NOTE — CHART NOTE - NSCHARTNOTEFT_GEN_A_CORE
ICU DOWNGRADE NOTE:    89[facility]    Patient is a 89y old Female who presents with a chief complaint of Stroke (19 Apr 2021 10:24)    The patient is currently admitted for the primary diagnosis of Stroke    Indwelling vascular catheters: peripheral IVs    Urinary Catheter: yes    Disposition: Acute    Code Status: Full    ICU COURSE OF EVENTS:  -------------------------------------------------------------------------------------------  89 years old female with PMHx of DM, HTN, bilateral diabetic foot ulcers, Atrial fibrillation ( no AC) , CHF, who presents to ED with complaint of non-responsiveness.     History obtained from son Abhinav by the bedside. As per son at bedside patient usually is verbal and has eyes open. She was seen last seen normal at 4AM later around 1 pm, son noticed that patient was unresponsive and not able to open eyes. she would not respond to the pain stimulus Patient was recently discharged from [facility] s/p fall, leg fx and ulcer debridements. Stroke code activated in ed. NIHSS score was 16. t-PA was not infused because she was out of window . During exam patient did not respond even to pain stimuli, would spontaneously move her left extremities with no response on the right.    Patient monitored over night with no improvement in the NIHSS. Will downgrade her to the stroke unit after approval with Dr. Gautam.    -------------------------------------------------------------------------------------------    Current workup in progress:    89 years old female with PMHx of DM, HTN, bilateral diabetic foot ulcers, Atrial fibrillation ( no AC) , CHF, who presents to ED with complaint of non-responsiveness.    Impression :    # acute ischemic stroke  # bilateral diabetic foot ulcers, Left tibial Fracture    # acute ischemic stroke (likely cardio-embolic)  - CTH shows acute infarct in left LOY and MCA territory  - NIHSS score 16 for motor deficit in all the extremities and non-responsiveness  - out of window for t-PA  - started on ASA and Statin  - Stroke neuro checks q4 hours  - MRI head pending  - check A1C, lipid profile, 2D echo  - PT and OT eval  - palliative care on board  - keep NPO for now ( as per S & S eval), start IVF, dicussed NG tube with son, he wants to consider IVF for now only    # AFIB (CHADVASC ~ 6)  - not on any AC  - EKG c/w AFib , heart rate controlled    # HTN  - keep BP < 180 mmHg in first 24 hours    # Hyperkalemia noted  - will do insulin dextrose protocol     # bilateral lower ext foot ulcer  - diabetic foot ulcer, last debrided at [facility] 6 weeks ago  - podiatry recs noted, wound care orders placed   - Burn consult    DVT ppx : SCD  GI ppx : IV protonix  bedrest  Code full      GOC  was done with son Abhinav by the bedside. He is inclining towards DNR/DNI but wants to discuss with siblings first. He will get back to us with answers tomorrow. Patient stands code full code for now.

## 2021-04-19 NOTE — SWALLOW BEDSIDE ASSESSMENT ADULT - SLP GENERAL OBSERVATIONS
pt received on ED stretcher asleep minimally arousable despite noxious stim. pt in no apparent pain. +2L NC; son at bedside

## 2021-04-19 NOTE — ED ADULT NURSE REASSESSMENT NOTE - NS ED NURSE REASSESS COMMENT FT1
Pt assessed. Pt is lethargic; arousable with stimuli and touch. Pt is cantonese; but non verbal post stroke. Pt admitted to ICU. Awaiting bed. Stroke neuro checks completed. NPO maintained. Pt is 2L NC. Pressure ulcer to B/L LE wrapped in kerlex. Bunn care rendered. Cardiac and 02 monitoring maintained. Fall precautions maintained. Safety and comfort

## 2021-04-19 NOTE — CONSULT NOTE ADULT - ASSESSMENT
89 years old female with PMHx of DM, HTN, bilateral diabetic foot ulcers (being evaluated by pod and burn) , Atrial fibrillation ( no AC) , CHF, who presents to ED with complaint of non-responsiveness acute ischemic stroke.    being evaluated for support with [facility]       Morphine Equivalent Daily Dose (MEDD)      See Recs below.    Please call   Ashley Espinosa  or [phone]  with questions or concerns.   We will continue to follow.    89 years old female with PMHx of DM, HTN, bilateral diabetic foot ulcers (being evaluated by pod and burn) , Atrial fibrillation ( no AC) , CHF, who presents to ED with complaint of non-responsiveness acute ischemic stroke.    being evaluated for support with [facility]       Morphine Equivalent Daily Dose (MEDD) na      See Recs below.    Please call   Ashley Espinosa  or [phone]/7  with questions or concerns.   We will continue to follow.

## 2021-04-19 NOTE — PROGRESS NOTE ADULT - SUBJECTIVE AND OBJECTIVE BOX
LENGTH OF HOSPITAL STAY: 1d    CHIEF COMPLAINT:   Patient is a 89y old  Female who presents with a chief complaint of Stroke (19 Apr 2021 10:24)      HISTORY OF PRESENTING ILLNESS:    HPI:  89 years old female with PMHx of DM, HTN, bilateral diabetic foot ulcers, Atrial fibrillation ( no AC) , CHF, who presents to ED with complaint of non-responsiveness.     History obtained from son Abhinav by the bedside. As per son at bedside patient usually is verbal and has eyes open. She was seen last seen normal at 4AM later around 1 pm, son noticed that patient was unresponsive and not able to open eyes. she would not respond to the pain stimulus Patient was recently discharged from [facility] s/p fall, leg fx and ulcer debridements. Stroke code activated in ed. NIHSS score was 16. t-PA was not infused because she was out of window . During exam patient did not respond even to pain stimuli, would spontaneously move her left extremities with no response on the right.    In Ed her BP was 161/72 mmHg, HR 88/min with normal oxygen saturation on RA  CTH showed acute infarct on the left side in LOY and MCA territory. seen by Neuro called to evaluate (18 Apr 2021 18:50)    PAST MEDICAL & SURGICAL HISTORY  PAST MEDICAL & SURGICAL HISTORY:  Diabetes mellitus    CHF (congestive heart failure)      SOCIAL HISTORY:    ALLERGIES:  No Known Allergies    MEDICATIONS:  STANDING MEDICATIONS  amLODIPine   Tablet 5 milliGRAM(s) Oral daily  aspirin Suppository 300 milliGRAM(s) Rectal daily  atorvastatin 80 milliGRAM(s) Oral at bedtime  clopidogrel Tablet 75 milliGRAM(s) Oral daily  dextrose 50% Injectable 50 milliLiter(s) IV Push once  insulin regular  human recombinant 10 Unit(s) IV Push once  sodium chloride 0.9%. 1000 milliLiter(s) IV Continuous <Continuous>    PRN MEDICATIONS    VITALS:   T(F): 99.6  HR: 94  BP: 166/86  RR: 20  SpO2: 99%    LABS:                        10.1   9.80  )-----------( 351      ( 18 Apr 2021 17:04 )             33.2     04-19    140  |  109  |  31<H>  ----------------------------<  196<H>  5.6<H>   |  14<L>  |  2.4<H>    Ca    8.7      19 Apr 2021 06:42    TPro  6.0  /  Alb  3.3<L>  /  TBili  0.6  /  DBili  0.3<H>  /  AST  15  /  ALT  10  /  AlkPhos  91  04-19    PT/INR - ( 19 Apr 2021 06:42 )   PT: 15.10 sec;   INR: 1.31 ratio         PTT - ( 19 Apr 2021 06:42 )  PTT:26.2 sec      Troponin T, Serum: 0.15 ng/mL *HH* (04-18-21 @ 17:04)      CARDIAC MARKERS ( 18 Apr 2021 17:04 )  x     / 0.15 ng/mL / x     / x     / x          RADIOLOGY:  < from: CT Angio Neck w/ IV Cont (04.18.21 @ 19:09) >    IMPRESSION:    Large perfusion defect involving the left LOY/MCA territory with a core infarct of 151 mLand ischemic penumbra of 314 mL.    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 Paucity of flow is noted involving the left LOY and MCA distal branch vasculature.    Nonopacification of the proximal left vertebral artery with reconstitution however multifocal stenoses noted along its course to the intracranial circulation though patent.    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    Small left pleural effusion.      < end of copied text >  < from: CT Brain Stroke Protocol (04.18.21 @ 17:44) >  IMPRESSION:    Findings consistent with left LOY/MCA territorial infarct with mild mass effect. No midline shift. No intracranial hemorrhage.    < end of copied text >    PHYSICAL EXAM:      GENERAL: ILL LOOKING  HEAD:  Atraumatic, Normocephalic  EYES: EOMI, PERRLA, conjunctiva and sclera clear  ENT: Moist mucous membranes  NECK: Supple, No JVD  CHEST/LUNG: dec entry l base  HEART: irregular, LEXIS 3/6  ABDOMEN: BSx4; Soft, nontender, nondistended  EXTREMITIES:  2+ Peripheral Pulses, brisk capillary refill. No clubbing, cyanosis, or edema  NERVOUS SYSTEM:  moves spon l side, r side hemiparesis, not following commands  SKIN: ulcer LENGTH OF HOSPITAL STAY: 1d    CHIEF COMPLAINT:   Patient is a 89y old  Female who presents with a chief complaint of Stroke (19 Apr 2021 10:24)      HISTORY OF PRESENTING ILLNESS:    HPI:  89 years old female with PMHx of DM, HTN, bilateral diabetic foot ulcers, chronic  Atrial fibrillation ( no AC) , CHF (possible HFpEF), who presents to ED with complaint of non-responsiveness.     History obtained from son Abhinav by the bedside. As per son at bedside patient usually is verbal and has eyes open. She was seen last seen normal at 4AM later around 1 pm, son noticed that patient was unresponsive and not able to open eyes. she would not respond to the pain stimulus Patient was recently discharged from [facility] s/p fall, leg fx and ulcer debridements. Stroke code activated in ed. NIHSS score was 16. t-PA was not infused because she was out of window . During exam patient did not respond even to pain stimuli, would spontaneously move her left extremities with no response on the right.    In Ed her BP was 161/72 mmHg, HR 88/min with normal oxygen saturation on RA  CTH showed acute infarct on the left side in LOY and MCA territory. seen by Neuro called to evaluate (18 Apr 2021 18:50)    PAST MEDICAL & SURGICAL HISTORY  PAST MEDICAL & SURGICAL HISTORY:  Diabetes mellitus    CHF (congestive heart failure)      SOCIAL HISTORY:    ALLERGIES:  No Known Allergies    MEDICATIONS:  STANDING MEDICATIONS  amLODIPine   Tablet 5 milliGRAM(s) Oral daily  aspirin Suppository 300 milliGRAM(s) Rectal daily  atorvastatin 80 milliGRAM(s) Oral at bedtime  clopidogrel Tablet 75 milliGRAM(s) Oral daily  dextrose 50% Injectable 50 milliLiter(s) IV Push once  insulin regular  human recombinant 10 Unit(s) IV Push once  sodium chloride 0.9%. 1000 milliLiter(s) IV Continuous <Continuous>    PRN MEDICATIONS    VITALS:   T(F): 99.6  HR: 94  BP: 166/86  RR: 20  SpO2: 99%    LABS:                        10.1   9.80  )-----------( 351      ( 18 Apr 2021 17:04 )             33.2     04-19    140  |  109  |  31<H>  ----------------------------<  196<H>  5.6<H>   |  14<L>  |  2.4<H>    Ca    8.7      19 Apr 2021 06:42    TPro  6.0  /  Alb  3.3<L>  /  TBili  0.6  /  DBili  0.3<H>  /  AST  15  /  ALT  10  /  AlkPhos  91  04-19    PT/INR - ( 19 Apr 2021 06:42 )   PT: 15.10 sec;   INR: 1.31 ratio         PTT - ( 19 Apr 2021 06:42 )  PTT:26.2 sec      Troponin T, Serum: 0.15 ng/mL *HH* (04-18-21 @ 17:04)      CARDIAC MARKERS ( 18 Apr 2021 17:04 )  x     / 0.15 ng/mL / x     / x     / x          RADIOLOGY:  < from: CT Angio Neck w/ IV Cont (04.18.21 @ 19:09) >    IMPRESSION:    Large perfusion defect involving the left LOY/MCA territory with a core infarct of 151 mLand ischemic penumbra of 314 mL.    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 Paucity of flow is noted involving the left LOY and MCA distal branch vasculature.    Nonopacification of the proximal left vertebral artery with reconstitution however multifocal stenoses noted along its course to the intracranial circulation though patent.    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    Small left pleural effusion.      < end of copied text >  < from: CT Brain Stroke Protocol (04.18.21 @ 17:44) >  IMPRESSION:    Findings consistent with left LOY/MCA territorial infarct with mild mass effect. No midline shift. No intracranial hemorrhage.    < end of copied text >    PHYSICAL EXAM:      GENERAL: ILL LOOKING  HEAD:  Atraumatic, Normocephalic  EYES: EOMI, PERRLA, conjunctiva and sclera clear  ENT: Moist mucous membranes  NECK: Supple, No JVD  CHEST/LUNG: dec entry l base  HEART: irregular, LEXIS 3/6  ABDOMEN: BSx4; Soft, nontender, nondistended  EXTREMITIES:  2+ Peripheral Pulses, brisk capillary refill. No clubbing, cyanosis, or edema  NERVOUS SYSTEM:  moves spon l side, r side hemiparesis, not following commands  SKIN: ulcer

## 2021-04-19 NOTE — PROGRESS NOTE ADULT - SUBJECTIVE AND OBJECTIVE BOX
Neurology Progress    Patient is a 89y old  Female who presents with a chief complaint of Stroke (19 Apr 2021 12:07)      HPI:  89 years old female with PMHx of DM, HTN, bilateral diabetic foot ulcers, Atrial fibrillation ( no AC) ,  CHF (possible HFpEF) , who presents to ED with complaint of non-responsiveness.     History obtained from son Abhinav by the bedside. As per son at bedside patient usually is verbal and has eyes open. She was seen last seen normal at 4AM later around 1 pm, son noticed that patient was unresponsive and not able to open eyes. she would not respond to the pain stimulus Patient was recently discharged from [facility] s/p fall, leg fx and ulcer debridements. Stroke code activated in ed. NIHSS score was 16. t-PA was not infused because she was out of window . During exam patient did not respond even to pain stimuli, would spontaneously move her left extremities with no response on the right.    In Ed her BP was 161/72 mmHg, HR 88/min with normal oxygen saturation on RA  CTH showed acute infarct on the left side in LOY and MCA territory. seen by Neuro called to evaluate (18 Apr 2021 18:50)      *** Neurology ***  Patient completed MRI brain today showing complete left LOY and MCA stroke and scattered punctate infarction posteriorly in right hemisphere.  Patient has afib and was on no anticoagulation.  She was outside window for TPA and despite LVO had fx leg and bilateral debridements so was nonambulatory.    PAST MEDICAL & SURGICAL HISTORY:  Diabetes mellitus    CHF (congestive heart failure)    FAMILY HISTORY:      Social History: (-) x 3    Allergies    No Known Allergies    Intolerances        MEDICATIONS  (STANDING):  amLODIPine   Tablet 5 milliGRAM(s) Oral daily  aspirin Suppository 300 milliGRAM(s) Rectal daily  atorvastatin 80 milliGRAM(s) Oral at bedtime  clopidogrel Tablet 75 milliGRAM(s) Oral daily  dextrose 40% Gel 15 Gram(s) Oral once  dextrose 5%. 1000 milliLiter(s) (50 mL/Hr) IV Continuous <Continuous>  dextrose 5%. 1000 milliLiter(s) (100 mL/Hr) IV Continuous <Continuous>  dextrose 50% Injectable 25 Gram(s) IV Push once  dextrose 50% Injectable 12.5 Gram(s) IV Push once  dextrose 50% Injectable 25 Gram(s) IV Push once  glucagon  Injectable 1 milliGRAM(s) IntraMuscular once  insulin glargine Injectable (LANTUS) 10 Unit(s) SubCutaneous at bedtime  insulin lispro (ADMELOG) corrective regimen sliding scale   SubCutaneous three times a day before meals  insulin lispro Injectable (ADMELOG) 3 Unit(s) SubCutaneous three times a day before meals  sodium chloride 0.9%. 1000 milliLiter(s) (65 mL/Hr) IV Continuous <Continuous>    MEDICATIONS  (PRN):      Review of systems:    Constitutional: No fever, weight loss or fatigue    Eyes: No eye pain or discharge  ENMT:  No difficulty hearing; No sinus or throat pain  Neck: No pain or stiffness  Respiratory: No cough, wheezing, chills or hemoptysis  Cardiovascular: No chest pain, palpitations, shortness of breath, dyspnea on exertion  Gastrointestinal: No abdominal pain, nausea, vomiting or hematemesis; No diarrhea or constipation.   Genitourinary: No dysuria, frequency, hematuria or incontinence  Neurological: As per HPI  Skin: No rashes or lesions   Endocrine: No heat or cold intolerance; No hair loss  Musculoskeletal: No joint pain or swelling  Psychiatric: No depression, anxiety, mood swings  Heme/Lymph: No easy bruising or bleeding gums    Vital Signs Last 24 Hrs  T(C): 37.6 (19 Apr 2021 06:48), Max: 37.6 (19 Apr 2021 05:48)  T(F): 99.6 (19 Apr 2021 06:48), Max: 99.6 (19 Apr 2021 05:48)  HR: 94 (19 Apr 2021 06:48) (76 - 110)  BP: 166/86 (19 Apr 2021 06:48) (132/92 - 167/67)  BP(mean): --  RR: 20 (19 Apr 2021 06:48) (16 - 20)  SpO2: 99% (19 Apr 2021 06:48) (97% - 100%)    Neurologic Examination:  General:  Appearance is consistent with chronologic age.    General: The patient is not responding to voice commands.   Son speaks to her at bedside and she does not follow any commands.    Cranial nerves: Some asymmetry present in face.  Pupils small, equal and reactive to light.  No blink to threat.  Eyes look to right even when some comes to left side and speaks to her.  Formal Muscle Strength Testing: all extremities fall to bed.  Minimal movement to painful stimuli.    NIH stroke scale    1a:  3	(LOC) 0-3  1b:  2	(mo/age) 0-2  1c:  2	(commands) 0-2  2:    1	(gaze) 0-2  3:    3	(fields) 0-3  4:    1	(face) 0-3  5a:   3	(left arm) 0-4  5b:   4	(right arm) 0-4  6a:   3	(left leg) 0-4  6b:   4	(right leg) 0-4  7:     0	(ataxia) 0-2  8:     2	(sensory) 0-2  9:     3 	(language) 0-3  10:   2	(dysarthria) 0-2  11:   0	(neglect) 0-2  ______________________  NIH stroke score:  33/42      Labs:   CBC Full  -  ( 18 Apr 2021 17:04 )  WBC Count : 9.80 K/uL  RBC Count : 4.14 M/uL  Hemoglobin : 10.1 g/dL  Hematocrit : 33.2 %  Platelet Count - Automated : 351 K/uL  Mean Cell Volume : 80.2 fL  Mean Cell Hemoglobin : 24.4 pg  Mean Cell Hemoglobin Concentration : 30.4 g/dL  Auto Neutrophil # : 6.47 K/uL  Auto Lymphocyte # : 2.53 K/uL  Auto Monocyte # : 0.73 K/uL  Auto Eosinophil # : 0.01 K/uL  Auto Basophil # : 0.02 K/uL  Auto Neutrophil % : 66.1 %  Auto Lymphocyte % : 25.8 %  Auto Monocyte % : 7.4 %  Auto Eosinophil % : 0.1 %  Auto Basophil % : 0.2 %    04-19    140  |  109  |  31<H>  ----------------------------<  196<H>  5.6<H>   |  14<L>  |  2.4<H>    Ca    8.7      19 Apr 2021 06:42    TPro  6.0  /  Alb  3.3<L>  /  TBili  0.6  /  DBili  0.3<H>  /  AST  15  /  ALT  10  /  AlkPhos  91  04-19    LIVER FUNCTIONS - ( 19 Apr 2021 06:42 )  Alb: 3.3 g/dL / Pro: 6.0 g/dL / ALK PHOS: 91 U/L / ALT: 10 U/L / AST: 15 U/L / GGT: x           PT/INR - ( 19 Apr 2021 06:42 )   PT: 15.10 sec;   INR: 1.31 ratio         PTT - ( 19 Apr 2021 06:42 )  PTT:26.2 sec    < from: CT Brain Stroke Protocol (04.18.21 @ 17:44) >  Findings consistent with left LOY/MCA territorial infarct with mild mass effect. No midline shift. No intracranial hemorrhage.    Dr. Wesley Hughes discussed preliminary findings with SUSAN GAYTAN on 4/18/2021 5:46 PM with readback.    < end of copied text >  < from: CT Perfusion w/ Maps w/ IV Cont (04.18.21 @ 18:03) >  Large perfusion defect involving the left LOY/MCA territory with a core infarct of 151 mLand ischemic penumbra of 314 mL.    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 Paucity of flow is noted involving the left LOY and MCA distal branch vasculature.    Nonopacification of the proximal left vertebral artery with reconstitution however multifocal stenoses noted along its course to the intracranial circulation though patent.    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    Small left pleural effusion.    < end of copied text >      Assessment:  This is a 89y Female with h/o atrial fibrillation not on AC presenting with left LOY/MCA infarct due to occlusion of left ICA.  Significant volume of infarcted brain is present and patient not candidate for neurointervention.  Stroke score has worsened overnight.     Plan:   1.  May continue antiplatelet therapy for now (rectal aspirin).  2.  Patient likely to require PEG however agree with further discussion with family on goals of care given the large dominant hemisphere infarction.  3.  As infarct completed would not utilize permissive hypertension at this point as increase risk for hemorrhagic conversion.      04-19-21 @ 19:58

## 2021-04-20 NOTE — CONSULT NOTE ADULT - ASSESSMENT
89y old female with PMHx of DM, HTN, bilateral diabetic foot ulcers, atrial fibrillation, CHF was admitted for stroke. Seen by Burn for left buttock, sacrum and left lower extremity wounds.    - No surgical intervention at this time. Spoke to the son Abhinav, the family does not want any invasive procedures.   - Local wound care: Please wash wound with soap and water. Left buttock: Applied Allevyn pad. Sacrum: Applied Xeroform, ABD and tape. Left lower extremity: Applied betadine, dry gauze, Xeroform and Kerlix. Dressing changes every 12 hours.   - Offloading/Positioning  - Prompt attention to incontinence

## 2021-04-20 NOTE — PROGRESS NOTE ADULT - PROBLEM SELECTOR PLAN 2
Son and daughter considering NGT/PEG placement   Possible D/C to hospice  Continue medical management for now, recs per neurology Son and daughter considering NGT/PEG placement   Possible D/C to hospice if pt remains stable   Continue medical management for now, recs per neurology

## 2021-04-20 NOTE — DIETITIAN INITIAL EVALUATION ADULT. - OTHER INFO
Pt admitted d/t stroke, Afib w RVR, elevated troponin, CARON, leg ulcer. CTH showed acute infarct in the L LOY/MCA territory- pt was out of window for tPA. MRI head pending. Pt remains NPO for now, on IVF. Per progress notes, pt son refused NGT for now. Pt son leaning towards DNR/DNI pending discussion with family.

## 2021-04-20 NOTE — DIETITIAN INITIAL EVALUATION ADULT. - PERTINENT LABORATORY DATA
(4/18) RBC 4.14, H/H 10.1/33.2; (4/19) K 5.6, BUN 31, Cr 2.4, glucose 196; (4/20) A1c 7.7%;  POC glucose on 4/20 is WNL (note that pt as been NPO).

## 2021-04-20 NOTE — DIETITIAN INITIAL EVALUATION ADULT. - RD TO REMAIN AVAILABLE
INTERVENTION: meals and snacks, coordination of care. ME: RD to monitor diet order, energy intake, body composition, NFPF, glucose/renal/electrolyte profiles/yes

## 2021-04-20 NOTE — PHYSICAL THERAPY INITIAL EVALUATION ADULT - SPECIFY REASON(S)
As per Dr. Spears physiatry pt is not medically stable, recommend to d/c PT and reconsult when appropriate.

## 2021-04-20 NOTE — DIETITIAN INITIAL EVALUATION ADULT. - SIGNS/SYMPTOMS
unstageable pressure ulcer (L gluteal fold), stage III pressure ulcer (sacral spine) SLP recommends NPO with alternate means of nutrition / hydration

## 2021-04-20 NOTE — CONSULT NOTE ADULT - SUBJECTIVE AND OBJECTIVE BOX
Patient is a 89y old  Female who presents with a chief complaint of Stroke (20 Apr 2021 15:49)      HPI:  89 years old female with PMHx of DM, HTN, bilateral diabetic foot ulcers, Atrial fibrillation ( no AC) ,  CHF (possible HFpEF) , who presents to ED with complaint of non-responsiveness.     History obtained from son Abhinav by the bedside. As per son at bedside patient usually is verbal and has eyes open. She was seen last seen normal at 4AM later around 1 pm, son noticed that patient was unresponsive and not able to open eyes. she would not respond to the pain stimulus Patient was recently discharged from [facility] s/p fall, leg fx and ulcer debridements. Stroke code activated in ed. NIHSS score was 16. t-PA was not infused because she was out of window . During exam patient did not respond even to pain stimuli, would spontaneously move her left extremities with no response on the right.    In Ed her BP was 161/72 mmHg, HR 88/min with normal oxygen saturation on RA  CTH showed acute infarct on the left side in LOY and MCA territory. seen by Neuro called to evaluate (18 Apr 2021 18:50)      PAST MEDICAL & SURGICAL HISTORY:  - Diabetes mellitus  - CHF (congestive heart failure)    Allergies  - No Known Allergies    PHYSICAL EXAM  Vital Signs Last 24 Hrs  T(C): 36.7 (20 Apr 2021 10:36), Max: 37.4 (19 Apr 2021 22:05)  T(F): 98 (20 Apr 2021 10:36), Max: 99.4 (19 Apr 2021 22:05)  HR: 98 (20 Apr 2021 10:36) (86 - 121)  BP: 136/71 (20 Apr 2021 10:36) (115/74 - 151/83)  BP(mean): 105 (20 Apr 2021 04:04) (91 - 110)  RR: 18 (20 Apr 2021 10:36) (17 - 20)  SpO2: 100% (20 Apr 2021 10:36) (99% - 100%)      PHYSICAL EXAM:  GENERAL: Patient found lying on her right side in bed in NAD  SKIN:       IMPRESSION:      PLAN:    CNS:    HEENT:    PULMONARY:    CARDIOVASCULAR:    GI: GI prophylaxis.  Feeding     RENAL:    INFECTIOUS DISEASE:    HEMATOLOGICAL:  DVT prophylaxis.    ENDOCRINE:  Follow up FS.  Insulin protocol if needed.    MUSCULOSKELETAL:    Wound care:  Patient is a 89 year old female with PMHx of DM, HTN, bilateral diabetic foot ulcers, atrial fibrillation, CHF was admitted for stroke and burn was consulted for left buttock, sacrum and left lower extremity wounds. Patients son Abhinav is by bedside and states that the patient had been recently discharged from [facility] hospital s/p a fall, which resulted in a leg fracture. Patients son states that the left lower leg wound, where the fracture resides, was not present prior to admission at [facility] but when discharged is when he noticed the discoloration.       PAST MEDICAL & SURGICAL HISTORY:  - Diabetes mellitus  - Hypertension  - Atrial fibrillation  - Congestive heart failure    Allergies  - No Known Allergies    Vital Signs Last 24 Hrs  T(C): 36.7 (20 Apr 2021 10:36), Max: 37.4 (19 Apr 2021 22:05)  T(F): 98 (20 Apr 2021 10:36), Max: 99.4 (19 Apr 2021 22:05)  HR: 98 (20 Apr 2021 10:36) (86 - 121)  BP: 136/71 (20 Apr 2021 10:36) (115/74 - 151/83)  BP(mean): 105 (20 Apr 2021 04:04) (91 - 110)  RR: 18 (20 Apr 2021 10:36) (17 - 20)  SpO2: 100% (20 Apr 2021 10:36) (99% - 100%)      PHYSICAL EXAM:  GENERAL: Patient found lying on her right side in bed in NAD  SKIN:   Left buttock - full thickness wound with central brown eschar with pink peripheral tissue approximately 12x5cm.    Sacrum - full thickness pink and clean wound with mild macerated tissue. Approximately 8x4cm. No puss, erythema or surrounding edema.   Left lower extremity - Multiple full thickness wounds with the largest being on the left shin and a few other on the lateral malleolus and planar aspect of the foot. Small amounts of viable tissue present.     LWC:   Left buttock -Applied Allevyn pad   Sacrum - Applied Xeroform, ABD and tape  Left lower extremity -Applied betadine, dry gauze, Xeroform and Kerlix. Plantar surface applied Xeroform and Kerlix.

## 2021-04-20 NOTE — PATIENT PROFILE ADULT - NSPROPTRIGHTNOTIFY_GEN_A_NUR
Addended by: Raúl Garcia on: 3/15/2021 10:33 AM     Modules accepted: Orders, SmartSet information could not be obtained

## 2021-04-20 NOTE — DIETITIAN INITIAL EVALUATION ADULT. - PHYSCIAL ASSESSMENT
No edema noted; unstageable pressure ulcer (L gluteal fold) + stage III pressure ulcer (sacral spine). Obese BMI per EMR; would like to confirm accuracy of CBW. [facility] did not work for RD upon attempt today.

## 2021-04-20 NOTE — CONSULT NOTE ADULT - ATTENDING COMMENTS
Left plantar ulcer; superficial-->local wound care with xeroform   Left heel eschar; stable-->betadine dressing w/ heel off-  recommend burn eval for lower extremity wound   no surgical intervention     Thank you for allowing me to participate in your patient care.   Santhosh Guzman DPM
89 year old admitted with dx iof stroke. Son at bedside states pt had recent fall at home and suffered left tib fib fx which was unrecognized at the time. Pt admitted to [facility] and fx discovered. At discharge son noted discoloration of leg around fx site   Pt has hx of foot ulcers and left buttock ulcer which son reports was draining "pus."    EXAM 4 x 3 cm gray brown eschar left ischial area - no purulence   right foot - lateral and heel ulceration with crusting     left leg- soft black necrotic skin and subcutis ~ 10 x 12 cm at site of fx - evidence by apparent bony discontinuity of leg ; patchy black eschar ankle, foot and toes       Long discussion with son.  Debridement of leg will likely lead to exposure of fx and yield no signifcant benefit   Family does not want debridement of wounds - no invasive procedures   Palliative care consult and GOC  in progress     Rec: betadine and dry dressing to sites for dessicant effect may stabilize wounds
Pt seen w above NP in ED.  Pt critically ill, appeasr chronically ill and frail at baseline.  Mt with son. Family considering DNR/DNI status  Grave prognosis   No symptoms to address at this time

## 2021-04-20 NOTE — PROGRESS NOTE ADULT - ASSESSMENT
89 years old female with PMHx of DM, HTN, bilateral diabetic foot ulcers, Atrial fibrillation (no AC) , CHF (possible HFpEF) , who presents to ED with complaint of non-responsiveness.    Patient has a massive left sided stroke and likely has grave prognosis. Discussed in detail with the son who demonstrated good understanding of the situation. Son wishes to discuss with sister. Counselled on the gravity of stroke and minimal to zero chance of any meaningful recovery. Discussed feeding options (undecided on NGT and PEG for now so holding on NGT placement) and the family said would follow in AM.    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 Would decide on it)    # Chronic Afib not on AC  - CHADVASC ~ 6  - not on any AC (age related decision not to anticoagulate)  - EKG c/w AFib , heart rate controlled    # HTN  - Hold antihypertensive    # bilateral lower ext foot ulcer  - diabetic foot ulcer, last debrided at [facility] 6 weeks ago  - podiatry recs noted, wound care orders placed   - Burn consulted    DVT ppx : Heparin  GI ppx : IV protonix  bedrest  Code DNR/DNI

## 2021-04-20 NOTE — DIETITIAN INITIAL EVALUATION ADULT. - ADD RECOMMEND
diet advancement per SLP rec vs potential need for EN pending GOC. Will continue to f/u with GOC in order to provide appropriate nutritional recommendations

## 2021-04-20 NOTE — DIETITIAN INITIAL EVALUATION ADULT. - PERSON TAUGHT/METHOD
d/c diet education deferred d/r pt current medical condition+ pending GOC d/c diet education deferred d/t pt current medical condition+ pending GOC

## 2021-04-20 NOTE — CONSULT NOTE ADULT - CONSULT REASON
stroke code
Bilateral lower extremity ulcer
stroke
Advanced age patient with large left LOY/MCA infarct. Please assist with GOC discussion.
Left buttock, sacrum and left lower extremity wounds.

## 2021-04-20 NOTE — PROGRESS NOTE ADULT - SUBJECTIVE AND OBJECTIVE BOX
SUBJECTIVE:    Patient is a 89y old Female who presents with a chief complaint of Stroke (2021 13:18)    Currently admitted to medicine with the primary diagnosis of Stroke       Today is hospital day 2d. This morning she is resting comfortably in bed and reports no new issues or overnight events.     INTERVAL EVENTS: Patient mute, moves only right arm spontaneously, gaze restricted to left side. Family at bedside    PAST MEDICAL & SURGICAL HISTORY  Diabetes mellitus    CHF (congestive heart failure)        ALLERGIES:  No Known Allergies    MEDICATIONS:  STANDING MEDICATIONS  amLODIPine   Tablet 5 milliGRAM(s) Oral daily  aspirin Suppository 300 milliGRAM(s) Rectal daily  atorvastatin 80 milliGRAM(s) Oral at bedtime  clopidogrel Tablet 75 milliGRAM(s) Oral daily  collagenase Ointment 1 Application(s) Topical two times a day  dextrose 40% Gel 15 Gram(s) Oral once  dextrose 5%. 1000 milliLiter(s) IV Continuous <Continuous>  dextrose 5%. 1000 milliLiter(s) IV Continuous <Continuous>  dextrose 50% Injectable 25 Gram(s) IV Push once  dextrose 50% Injectable 12.5 Gram(s) IV Push once  dextrose 50% Injectable 25 Gram(s) IV Push once  glucagon  Injectable 1 milliGRAM(s) IntraMuscular once  heparin   Injectable 5000 Unit(s) SubCutaneous every 12 hours  insulin glargine Injectable (LANTUS) 10 Unit(s) SubCutaneous at bedtime  insulin lispro (ADMELOG) corrective regimen sliding scale   SubCutaneous three times a day before meals  insulin lispro Injectable (ADMELOG) 3 Unit(s) SubCutaneous three times a day before meals  sodium chloride 0.9%. 1000 milliLiter(s) IV Continuous <Continuous>    PRN MEDICATIONS    VITALS:   T(F): 98  HR: 98  BP: 136/71  RR: 18  SpO2: 100%    LABS:                        10.1   9.80  )-----------( 351      ( 2021 17:04 )             33.2     04-19    140  |  109  |  31<H>  ----------------------------<  196<H>  5.6<H>   |  14<L>  |  2.4<H>    Ca    8.7      2021 06:42    TPro  6.0  /  Alb  3.3<L>  /  TBili  0.6  /  DBili  0.3<H>  /  AST  15  /  ALT  10  /  AlkPhos  91  04-19    PT/INR - ( 2021 06:42 )   PT: 15.10 sec;   INR: 1.31 ratio         PTT - ( 2021 06:42 )  PTT:26.2 sec  Urinalysis Basic - ( 2021 04:50 )    Color: Yellow / Appearance: Slightly Turbid / S.046 / pH: x  Gluc: x / Ketone: Trace  / Bili: Negative / Urobili: <2 mg/dL   Blood: x / Protein: 100 mg/dL / Nitrite: Negative   Leuk Esterase: Large / RBC: 12 /HPF /  /HPF   Sq Epi: x / Non Sq Epi: 1 /HPF / Bacteria: Few            Culture - Blood (collected 2021 17:04)  Source: .Blood Blood-Peripheral  Preliminary Report (2021 23:01):    No growth to date.    Culture - Blood (collected 2021 17:04)  Source: .Blood Blood-Peripheral  Preliminary Report (2021 23:01):    No growth to date.      CARDIAC MARKERS ( 2021 17:04 )  x     / 0.15 ng/mL / x     / x     / x          RADIOLOGY:    PHYSICAL EXAM:  GEN: No acute distress  PULM/CHEST: Bilateral Crackles  CVS: Irregularly irregular  ABD: Soft, non-tender, non-distended, +BS  EXT: No edema  NEURO: mute, moves only right arm, responds to pain on the left arm    Bunn Catheter:   Indwelling Urethral Catheter:     Connect To:  Straight Drainage/Gravity    Indication:  Traumatic Injury requiring immobilization (21 @ 17:21) (not performed) [Active]

## 2021-04-20 NOTE — DIETITIAN INITIAL EVALUATION ADULT. - FACTORS AFF FOOD INTAKE
Per SLP on 4/19 "PO trials not appropriate at this time 2' pt minimally arousable and w/ high risk for aspiration." recommended NPO with alternate means of nutrition/hydration--- per SLP note, baseline diet is regular with thin liquids. Last BM on 4/20. No GI s/s of note./NPO

## 2021-04-20 NOTE — DIETITIAN INITIAL EVALUATION ADULT. - OTHER CALCULATIONS
wt used: IBW 45.3 kg used d/t ? BMI >40.; Kcal: 8542-5288 kcal/d (35-38 kcal/kg IBW) pressure ulcers + obese BMI + advanced age considered; 59-72 g (1.3-1.6 g/kg IBW) same as above; Fluid: 1 mL/kcal or per LIP

## 2021-04-20 NOTE — GOALS OF CARE CONVERSATION - ADVANCED CARE PLANNING - CONVERSATION DETAILS
Palliative Care team met with pt.'s son at bedside; palliative care was reintroduced and pt.'s overall clinical status was reviewed.  All questions were answered in detail, and questions specific to neurological status was referred to medical/neurology teams.  Son states she has considered code and status and opts for DNR/I status as to not prolong suffering.  For now, son wishes to continue further medical management to see if patient improves or declines, at whish time further decisions regarding GOC will be made.  Feeding tubes/artificial nutrition discussed and son seemed ok with NG tube placement for temporary artificial nutrition, but was conflicted about eventual PEG placement.  Education provided on feeding tubes in advanced illness and ACP video on feeding tubes prescribed for further educational purposes.  Hospice care additionally introduced and reviewed.  DNR/I  Support and education provided.  Ongoing GOC conversations as appropriate.  KATHYA completed.

## 2021-04-20 NOTE — PROGRESS NOTE ADULT - SUBJECTIVE AND OBJECTIVE BOX
KARRI HUGHES             MRN-[id number]    CC: Stroke     HPI:  89 years old female with PMHx of DM, HTN, bilateral diabetic foot ulcers, Atrial fibrillation ( no AC) ,  CHF (possible HFpEF) , who presents to ED with complaint of non-responsiveness.     History obtained from son Abhinav by the bedside. As per son at bedside patient usually is verbal and has eyes open. She was seen last seen normal at 4AM later around 1 pm, son noticed that patient was unresponsive and not able to open eyes. she would not respond to the pain stimulus Patient was recently discharged from [facility] s/p fall, leg fx and ulcer debridements. Stroke code activated in ed. NIHSS score was 16. t-PA was not infused because she was out of window . During exam patient did not respond even to pain stimuli, would spontaneously move her left extremities with no response on the right.    In Ed her BP was 161/72 mmHg, HR 88/min with normal oxygen saturation on RA  CTH showed acute infarct on the left side in LOY and MCA territory. seen by Neuro called to evaluate      SUBJECTIVE:  UNABLE TO OBTAIN  due to: AMS     PEx:  Vital Signs Last 24 Hrs  T(C): 36.7 (20 Apr 2021 10:36), Max: 37.4 (19 Apr 2021 22:05)  T(F): 98 (20 Apr 2021 10:36), Max: 99.4 (19 Apr 2021 22:05)  HR: 98 (20 Apr 2021 10:36) (86 - 121)  BP: 136/71 (20 Apr 2021 10:36) (115/74 - 151/83)  BP(mean): 105 (20 Apr 2021 04:04) (91 - 110)  RR: 18 (20 Apr 2021 10:36) (17 - 20)  SpO2: 100% (20 Apr 2021 10:36) (99% - 100%)        General: sleeping. ill-appearing, lying in bed, NAD  HEENT:  NCAT, eyes closed   Neck: Supple no masses  CVS: RR   Resp: Unlabored Non tachypneic No increased WOB, on room air  GI:  Soft NT ND   :  Bunn draining light yellow urine  Musc: No C/C/E    Neuro: Does not follow commands, overall unresponsive, nonverbal   Psych: Calm, no agitation  Skin: Non jaundiced, no rashes        MEDICATIONS: REVIEWED  MEDICATIONS  (STANDING):  amLODIPine   Tablet 5 milliGRAM(s) Oral daily  aspirin Suppository 300 milliGRAM(s) Rectal daily  atorvastatin 80 milliGRAM(s) Oral at bedtime  clopidogrel Tablet 75 milliGRAM(s) Oral daily  collagenase Ointment 1 Application(s) Topical two times a day  dextrose 40% Gel 15 Gram(s) Oral once  dextrose 5%. 1000 milliLiter(s) (50 mL/Hr) IV Continuous <Continuous>  dextrose 5%. 1000 milliLiter(s) (100 mL/Hr) IV Continuous <Continuous>  dextrose 50% Injectable 25 Gram(s) IV Push once  dextrose 50% Injectable 12.5 Gram(s) IV Push once  dextrose 50% Injectable 25 Gram(s) IV Push once  glucagon  Injectable 1 milliGRAM(s) IntraMuscular once  heparin   Injectable 5000 Unit(s) SubCutaneous every 12 hours  insulin glargine Injectable (LANTUS) 10 Unit(s) SubCutaneous at bedtime  insulin lispro (ADMELOG) corrective regimen sliding scale   SubCutaneous three times a day before meals  insulin lispro Injectable (ADMELOG) 3 Unit(s) SubCutaneous three times a day before meals  sodium chloride 0.9%. 1000 milliLiter(s) (65 mL/Hr) IV Continuous <Continuous>    MEDICATIONS  (PRN):      LABS: REVIEWED  CBC:                        10.1   9.80  )-----------( 351      ( 18 Apr 2021 17:04 )             33.2     CMP:    04-19    140  |  109  |  31<H>  ----------------------------<  196<H>  5.6<H>   |  14<L>  |  2.4<H>    Ca    8.7      19 Apr 2021 06:42    TPro  6.0  /  Alb  3.3<L>  /  TBili  0.6  /  DBili  0.3<H>  /  AST  15  /  ALT  10  /  AlkPhos  91  04-19  Albumin, Serum: 3.3 g/dL (04-19-21 @ 06:42)    IMAGING: REVIEWED    ADVANCED DIRECTIVES:            FULL CODE          DECISION MAKER: Son, Abhinav Hughes and his sister.       CURRENT DISPO PLAN:     WILL REMAIN IN HOSPITAL      REFERRALS	        Palliative Med  to refer ACP video for education     GOALS OF CARE      * 30 minutes spent discussing advance care planning with son at bedside.   - discussed code status today  - discussed PEG/NGT feeds today   - discussed hospice as well   - see GOC note with social work, Luz Maria Flores, for full details

## 2021-04-20 NOTE — DIETITIAN INITIAL EVALUATION ADULT. - REASON INDICATOR FOR ASSESSMENT
pressure injury staged 2 or >; pt non-verbal + disoriented x4 therefore unable to provide hx; family to have GOC meeting today. Deferred nutrition hx for now.

## 2021-04-20 NOTE — PROGRESS NOTE ADULT - ASSESSMENT
89yFemale being evaluated for goals of care in the setting of acute infarct on the left side in LOY and MCA territory. Patient remains largely unresponsive, and family wishes to give her some time to see if she recovers. They did agree to place DNR/DNI order today, and wish for her to continue medical management for now.     They will think about and discuss as a family the possibility of PEG/NGT placement, versus comfort feeding. Reassured her son that he has time to make these decisions.       MEDD (morphine equivalent daily dose): 0    * Plan discussed with primary team.       See Recs below.    Please call [phone] with questions or concerns 24/7.   We will continue to follow.

## 2021-04-21 NOTE — OCCUPATIONAL THERAPY INITIAL EVALUATION ADULT - SPECIFY REASON(S)
As per Dr. Mcgill, pt is medically unstable. D/c from OT skilled intervention program. As per Dr. Mcgill, pt is medically unstable. OT consult is cancelled. D/c from OT skilled intervention program. As per Dr. Mcgill, pt is medically unstable. Pt on hold. Will follow up when appropriate.

## 2021-04-21 NOTE — PROGRESS NOTE ADULT - SUBJECTIVE AND OBJECTIVE BOX
KARRI BEVERLY             MRN-[id number]    CC: Stroke     HPI:  89 years old female with PMHx of DM, HTN, bilateral diabetic foot ulcers, Atrial fibrillation ( no AC) ,  CHF (possible HFpEF) , who presents to ED with complaint of non-responsiveness.     History obtained from son Abhinav by the bedside. As per son at bedside patient usually is verbal and has eyes open. She was seen last seen normal at 4AM later around 1 pm, son noticed that patient was unresponsive and not able to open eyes. she would not respond to the pain stimulus Patient was recently discharged from [facility] s/p fall, leg fx and ulcer debridements. Stroke code activated in ed. NIHSS score was 16. t-PA was not infused because she was out of window . During exam patient did not respond even to pain stimuli, would spontaneously move her left extremities with no response on the right.    In Ed her BP was 161/72 mmHg, HR 88/min with normal oxygen saturation on RA  CTH showed acute infarct on the left side in LOY and MCA territory. seen by Neuro called to evaluate (18 Apr 2021 18:50)      SUBJECTIVE:  UNABLE TO OBTAIN  due to: AMS    PEx:  Vital Signs Last 24 Hrs  T(C): 37.1 (21 Apr 2021 00:39), Max: 37.1 (21 Apr 2021 00:39)  T(F): 98.8 (21 Apr 2021 00:39), Max: 98.8 (21 Apr 2021 00:39)  HR: 99 (21 Apr 2021 01:03) (99 - 125)  BP: 118/70 (21 Apr 2021 11:19) (114/69 - 146/60)  BP(mean): 81 (21 Apr 2021 01:03) (81 - 86)  RR: 18 (21 Apr 2021 11:19) (18 - 20)  SpO2: 98% (21 Apr 2021 01:03) (95% - 98%)    General: sleeping, ill-appearing, lying in bed, NAD  HEENT:  NCAT  Neck: Supple no masses  Resp: Unlabored Non tachypneic No increased WOB, On NC  GI:  Soft NT ND   :  Bunn  Musc: No C/C/E    Neuro: Follows commands No focal deficits  Psych: Calm, no agitation   Skin: Non jaundiced, no rash       MEDICATIONS: REVIEWED  MEDICATIONS  (STANDING):  amLODIPine   Tablet 5 milliGRAM(s) Oral daily  aspirin Suppository 300 milliGRAM(s) Rectal daily  atorvastatin 80 milliGRAM(s) Oral at bedtime  clopidogrel Tablet 75 milliGRAM(s) Oral daily  collagenase Ointment 1 Application(s) Topical two times a day  dextrose 40% Gel 15 Gram(s) Oral once  dextrose 5%. 1000 milliLiter(s) (50 mL/Hr) IV Continuous <Continuous>  dextrose 5%. 1000 milliLiter(s) (100 mL/Hr) IV Continuous <Continuous>  dextrose 50% Injectable 25 Gram(s) IV Push once  dextrose 50% Injectable 12.5 Gram(s) IV Push once  dextrose 50% Injectable 25 Gram(s) IV Push once  glucagon  Injectable 1 milliGRAM(s) IntraMuscular once  heparin   Injectable 5000 Unit(s) SubCutaneous every 12 hours  insulin glargine Injectable (LANTUS) 10 Unit(s) SubCutaneous at bedtime  insulin lispro (ADMELOG) corrective regimen sliding scale   SubCutaneous three times a day before meals  insulin lispro Injectable (ADMELOG) 3 Unit(s) SubCutaneous three times a day before meals  scopolamine 1 mG/72 Hr(s) Patch 1 Patch Transdermal every 72 hours    MEDICATIONS  (PRN):      LABS: REVIEWED  CBC:                        9.6    14.04 )-----------( 336      ( 21 Apr 2021 05:21 )             31.8     CMP:    04-21    147<H>  |  117<H>  |  37<H>  ----------------------------<  59<L>  4.9   |  17  |  2.9<H>    Ca    8.7      21 Apr 2021 05:21  Mg     2.1     04-21    TPro  5.6<L>  /  Alb  3.0<L>  /  TBili  0.6  /  DBili  x   /  AST  83<H>  /  ALT  27  /  AlkPhos  108  04-21  Albumin, Serum: 3.0 g/dL (04-21-21 @ 05:21)    ADVANCED DIRECTIVES:                 DNR DNI         DECISION MAKER: SonAbhinav, in collaboration with paty of patient.     GOALS OF CARE DISCUSSION  - yesterday was made DNR/I  - sent family videos re: artificial feedings versus comfort feedings, family undecided   - will follow up with family later this week     CURRENT DISPO PLAN:     WILL REMAIN IN HOSPITAL   KARRI BEVERLY             MRN-[id number]    CC: Stroke     HPI:  89 years old female with PMHx of DM, HTN, bilateral diabetic foot ulcers, Atrial fibrillation ( no AC) ,  CHF (possible HFpEF) , who presents to ED with complaint of non-responsiveness.     History obtained from son Abhinav by the bedside. As per son at bedside patient usually is verbal and has eyes open. She was seen last seen normal at 4AM later around 1 pm, son noticed that patient was unresponsive and not able to open eyes. she would not respond to the pain stimulus Patient was recently discharged from [facility] s/p fall, leg fx and ulcer debridements. Stroke code activated in ed. NIHSS score was 16. t-PA was not infused because she was out of window . During exam patient did not respond even to pain stimuli, would spontaneously move her left extremities with no response on the right.    In Ed her BP was 161/72 mmHg, HR 88/min with normal oxygen saturation on RA  CTH showed acute infarct on the left side in LOY and MCA territory. seen by Neuro called to evaluate (18 Apr 2021 18:50)      SUBJECTIVE:  UNABLE TO OBTAIN  due to: AMS    PEx:  Vital Signs Last 24 Hrs  T(C): 37.1 (21 Apr 2021 00:39), Max: 37.1 (21 Apr 2021 00:39)  T(F): 98.8 (21 Apr 2021 00:39), Max: 98.8 (21 Apr 2021 00:39)  HR: 99 (21 Apr 2021 01:03) (99 - 125)  BP: 118/70 (21 Apr 2021 11:19) (114/69 - 146/60)  BP(mean): 81 (21 Apr 2021 01:03) (81 - 86)  RR: 18 (21 Apr 2021 11:19) (18 - 20)  SpO2: 98% (21 Apr 2021 01:03) (95% - 98%)    General: sleeping, ill-appearing, lying in bed, NAD  HEENT:  NCAT  Neck: Supple no masses  Resp: Unlabored Non tachypneic No increased WOB, On NC  GI:  Soft NT ND   :  Bunn  Musc: No C/C/E    Neuro: does not follow commands, minimally responsive  Psych: Calm, no agitation   Skin: Non jaundiced, no rash       MEDICATIONS: REVIEWED  MEDICATIONS  (STANDING):  amLODIPine   Tablet 5 milliGRAM(s) Oral daily  aspirin Suppository 300 milliGRAM(s) Rectal daily  atorvastatin 80 milliGRAM(s) Oral at bedtime  clopidogrel Tablet 75 milliGRAM(s) Oral daily  collagenase Ointment 1 Application(s) Topical two times a day  dextrose 40% Gel 15 Gram(s) Oral once  dextrose 5%. 1000 milliLiter(s) (50 mL/Hr) IV Continuous <Continuous>  dextrose 5%. 1000 milliLiter(s) (100 mL/Hr) IV Continuous <Continuous>  dextrose 50% Injectable 25 Gram(s) IV Push once  dextrose 50% Injectable 12.5 Gram(s) IV Push once  dextrose 50% Injectable 25 Gram(s) IV Push once  glucagon  Injectable 1 milliGRAM(s) IntraMuscular once  heparin   Injectable 5000 Unit(s) SubCutaneous every 12 hours  insulin glargine Injectable (LANTUS) 10 Unit(s) SubCutaneous at bedtime  insulin lispro (ADMELOG) corrective regimen sliding scale   SubCutaneous three times a day before meals  insulin lispro Injectable (ADMELOG) 3 Unit(s) SubCutaneous three times a day before meals  scopolamine 1 mG/72 Hr(s) Patch 1 Patch Transdermal every 72 hours    MEDICATIONS  (PRN):      LABS: REVIEWED  CBC:                        9.6    14.04 )-----------( 336      ( 21 Apr 2021 05:21 )             31.8     CMP:    04-21    147<H>  |  117<H>  |  37<H>  ----------------------------<  59<L>  4.9   |  17  |  2.9<H>    Ca    8.7      21 Apr 2021 05:21  Mg     2.1     04-21    TPro  5.6<L>  /  Alb  3.0<L>  /  TBili  0.6  /  DBili  x   /  AST  83<H>  /  ALT  27  /  AlkPhos  108  04-21  Albumin, Serum: 3.0 g/dL (04-21-21 @ 05:21)    ADVANCED DIRECTIVES:                 DNR DNI         DECISION MAKER: SonAbhinav, in collaboration with paytonter of patient.     GOALS OF CARE DISCUSSION  - yesterday was made DNR/I  - sent family videos re: artificial feedings versus comfort feedings, family undecided   - will follow up with family later this week     CURRENT DISPO PLAN:     WILL REMAIN IN HOSPITAL

## 2021-04-21 NOTE — CHART NOTE - NSCHARTNOTEFT_GEN_A_CORE
Was called by nurse because pt had desaturated to 82% on RA. Nurse placed pt on O2 NC 3L with improvement of saturation to 98%, also suctioned pt because pt appears to have significant upper airway congestion with no improvement.   On assessment of pt, appears lethargic (consistent with presentation on admission), auscultation of lungs reveals diffuse rhonchi, bibasilar bases. Will discontinue IVF for now and administer IV Lasix 40 mg x1. Will also initiate Scopolamine patch.  Will obtain stat CXR. Was called by nurse because pt had desaturated to 82% on RA. Nurse placed pt on O2 NC 3L with improvement of saturation to 98%, also suctioned pt because pt appears to have significant upper airway congestion with no improvement.   On assessment of pt, appears lethargic (consistent with presentation on admission), auscultation of lungs reveals diffuse rhonchi, rales in L lung base. Will discontinue IVF for now and administer IV Lasix 40 mg x1. Will also initiate Scopolamine patch.  Will obtain stat CXR.

## 2021-04-21 NOTE — OCCUPATIONAL THERAPY INITIAL EVALUATION ADULT - PATIENT PROFILE REVIEW, REHAB EVAL
2830 Pt chart thoroughly reviewed prior to OT evaluation./yes
Pt's chart reviewed./yes
OT reviewed pt's chart prior to evaluation./yes

## 2021-04-21 NOTE — PROGRESS NOTE ADULT - ASSESSMENT
89 years old female with PMHx of DM, HTN, bilateral diabetic foot ulcers, Atrial fibrillation (no AC) , CHF (possible HFpEF) , who presents to ED with complaint of non-responsiveness.    Patient has a massive left sided stroke and likely has grave prognosis. Discussed in detail with the son who demonstrated good understanding of the situation. Counselled on the gravity of stroke and minimal to zero chance of any meaningful recovery.  Son discussed the case with sister. Discussed feeding options and family wants a trial of NGT feeding. NGT placed and feeding order placed.     # Acute LOY/MCA territory ischemic stroke 2/2 Left ICA occlusion  - likely cardio-embolic given history of Afib  - CTH shows acute infarct in left LOY and MCA territory  - NIHSS score over 30 for motor deficit in all the extremities and non-responsiveness  - was out of window for t-PA and too extensive damage for neurointervention  - Continue on ASA and Statin  - Downgraded from stroke unit  - MRI Head shows no hemorrhagic conversion but mass effect  - PT OT evaluated. Patient unable to cooperate  - palliative care following  - Will give tube feeding for now  - Family wants to give her a trial of few days on tube feeding and then decide on further course of action    # Chronic Afib not on AC  - CHADVASC ~ 6  - not on any AC (age related decision not to anticoagulate)  - EKG c/w AFib , heart rate controlled    # HTN  - Hold antihypertensives    # Bilateral lower ext foot ulcer  - diabetic foot ulcer, last debrided at [facility] 6 weeks ago  - podiatry recs noted, wound care orders placed   - Burn consulted but family refused intervention    DVT ppx : Heparin  GI ppx : IV protonix  bedrest  Code DNR/DNI

## 2021-04-21 NOTE — PROGRESS NOTE ADULT - SUBJECTIVE AND OBJECTIVE BOX
SUBJECTIVE:    Patient is a 89y old Female who presents with a chief complaint of Stroke (2021 16:11)    Currently admitted to medicine with the primary diagnosis of Stroke       Today is hospital day 3d. This morning she is resting comfortably in bed and reports no new issues or overnight events.     INTERVAL EVENTS: Patient desaturated overnight. Fluids stopped and given a dose of Lasix with improvement in saturations    PAST MEDICAL & SURGICAL HISTORY  Diabetes mellitus    CHF (congestive heart failure)        ALLERGIES:  No Known Allergies    MEDICATIONS:  STANDING MEDICATIONS  amLODIPine   Tablet 5 milliGRAM(s) Oral daily  aspirin Suppository 300 milliGRAM(s) Rectal daily  atorvastatin 80 milliGRAM(s) Oral at bedtime  clopidogrel Tablet 75 milliGRAM(s) Oral daily  collagenase Ointment 1 Application(s) Topical two times a day  dextrose 40% Gel 15 Gram(s) Oral once  dextrose 5%. 1000 milliLiter(s) IV Continuous <Continuous>  dextrose 5%. 1000 milliLiter(s) IV Continuous <Continuous>  dextrose 50% Injectable 25 Gram(s) IV Push once  dextrose 50% Injectable 12.5 Gram(s) IV Push once  dextrose 50% Injectable 25 Gram(s) IV Push once  glucagon  Injectable 1 milliGRAM(s) IntraMuscular once  heparin   Injectable 5000 Unit(s) SubCutaneous every 12 hours  insulin glargine Injectable (LANTUS) 10 Unit(s) SubCutaneous at bedtime  insulin lispro (ADMELOG) corrective regimen sliding scale   SubCutaneous three times a day before meals  insulin lispro Injectable (ADMELOG) 3 Unit(s) SubCutaneous three times a day before meals  scopolamine 1 mG/72 Hr(s) Patch 1 Patch Transdermal every 72 hours    PRN MEDICATIONS    VITALS:   T(F): 98.8  HR: 99  BP: 118/70  RR: 18  SpO2: 98%    LABS:                        9.6    14.04 )-----------( 336      ( 2021 05:21 )             31.8     04-21    147<H>  |  117<H>  |  37<H>  ----------------------------<  59<L>  4.9   |  17  |  2.9<H>    Ca    8.7      2021 05:21  Mg     2.1     04-21    TPro  5.6<L>  /  Alb  3.0<L>  /  TBili  0.6  /  DBili  x   /  AST  83<H>  /  ALT  27  /  AlkPhos  108        Urinalysis Basic - ( 2021 04:50 )    Color: Yellow / Appearance: Slightly Turbid / S.046 / pH: x  Gluc: x / Ketone: Trace  / Bili: Negative / Urobili: <2 mg/dL   Blood: x / Protein: 100 mg/dL / Nitrite: Negative   Leuk Esterase: Large / RBC: 12 /HPF /  /HPF   Sq Epi: x / Non Sq Epi: 1 /HPF / Bacteria: Few            Culture - Blood (collected 2021 17:04)  Source: .Blood Blood-Peripheral  Preliminary Report (2021 23:01):    No growth to date.    Culture - Blood (collected 2021 17:04)  Source: .Blood Blood-Peripheral  Preliminary Report (2021 23:01):    No growth to date.          RADIOLOGY:    PHYSICAL EXAM:  GEN: No acute distress  PULM/CHEST: Bilateral Crackles  CVS: Irregularly irregular  ABD: Soft, non-tender, non-distended, +BS  EXT: No edema  NEURO: mute, moves only right arm, responds to pain on the left arm    Bunn Catheter:   Indwelling Urethral Catheter:     Connect To:  Straight Drainage/Gravity    Indication:  Traumatic Injury requiring immobilization (21 @ 17:21) (not performed) [Active]

## 2021-04-21 NOTE — PROGRESS NOTE ADULT - ASSESSMENT
89yFemale being evaluated for goals of care in the setting of acute infarct on the left side in LOY and MCA territory. Patient remains largely unresponsive, and family wishes to give her some time to see if she recovers. Last night patient desatted and required O2 supplementation, has increased secretions in airway. Patient remains comfortable, with ongoing medical management for now.     Will follow up with family likely tomorrow to re-address GOC. Family was undecided when we last spoke about comfort care and hospice.    MEDD (morphine equivalent daily dose): 0      See Recs below.    Please call [phone] with questions or concerns 24/7.   We will continue to follow.

## 2021-04-22 NOTE — PROGRESS NOTE ADULT - PROBLEM SELECTOR PLAN 5
see abobe  in sum, cont current level of care family knows end of life and needs time to decide about CMO  DNR/I

## 2021-04-22 NOTE — PROGRESS NOTE ADULT - ATTENDING COMMENTS
Patient examined this morning and had NIH score of 34.  MRI reviewed and showed completed left LOY and left MCA infarction.  Additional punctate areas of ischemia are seen in right hemisphere posteriorly likely reflecting embolic nature of the infarcts.  Patient is now mute and minimal movement in extremities as documented above.  Very poor prognosis and high likelihood for aspiration.  She will likely require a PEG tube.  Palliative care consultation is suggested to help address goals of care.  Discussion begun with family.
Patient examined today in f/u for massive left hemisphere stroke due to left ICA occlusion (probably from cardioembolism) causing infarction in left LOY and MCA territories.  She also has a few small punctate infarcts in posterior right hemisphere due to cardioembolism from atrial fibrillation.  She has stroke score of 34 and was having breathing difficulty earlier which improved.  SHe can be given DVT prophylaxis and family has permitted tube feedings and will reassess in a few days regarding whether to institute palliative care measures.
Patient examined this morning.  She has NG tube and receiving feedings.  No significant events overnight.  This morning less movement in LUE than before.  Right hemiplegia, aphasia, and left gaze preference still present.  Continue present management. Await families input on palliative care.  Prognosis is very poor from neurologic standpoint.  Okay to move out of stroke unit.
Pt seen w above NP.  Minimally responsive, no significant improvement in neuro status  No symptoms to address  Will f/u w family   Recs as above
Pt seen w above NP.  No significant improvement in status.  DNR/DNI in place  Met with son at bedside. Education provided on the risks vs benefits of artificial feeding at this stage of life.  No symptms to address  Prognosis appears grave  Will meet with son again tomorrow  Support provided

## 2021-04-22 NOTE — PROGRESS NOTE ADULT - PROVIDER SPECIALTY LIST ADULT
Internal Medicine
MICU
Neurology
Palliative Care

## 2021-04-22 NOTE — PROGRESS NOTE ADULT - SUBJECTIVE AND OBJECTIVE BOX
KARRI BEVERLY             MRN-[id number]    CC: Rohan at bedside - hoping possibilty to bring home on Hospice yet conflicted re: CMO status; patient demonstring mild distress    HPI:  89 years old female with PMHx of DM, HTN, bilateral diabetic foot ulcers, Atrial fibrillation ( no AC) ,  CHF (possible HFpEF) , who presents to ED with complaint of non-responsiveness.     History obtained from son Abhinav by the bedside. As per son at bedside patient usually is verbal and has eyes open. She was seen last seen normal at 4AM later around 1 pm, son noticed that patient was unresponsive and not able to open eyes. she would not respond to the pain stimulus Patient was recently discharged from [facility] s/p fall, leg fx and ulcer debridements. Stroke code activated in ed. NIHSS score was 16. t-PA was not infused because she was out of window . During exam patient did not respond even to pain stimuli, would spontaneously move her left extremities with no response on the right.    In Ed her BP was 161/72 mmHg, HR 88/min with normal oxygen saturation on RA  CTH showed acute infarct on the left side in LOY and MCA territory. seen by Neuro called to evaluate (18 Apr 2021 18:50)      SUBJECTIVE:  UNABLE TO OBTAIN  due to: AMS    PEx:  Vital Signs Last 24 Hrs  T(C): 37.1 (21 Apr 2021 00:39), Max: 37.1 (21 Apr 2021 00:39)  T(F): 98.8 (21 Apr 2021 00:39), Max: 98.8 (21 Apr 2021 00:39)  HR: 99 (21 Apr 2021 01:03) (99 - 125)  BP: 118/70 (21 Apr 2021 11:19) (114/69 - 146/60)  BP(mean): 81 (21 Apr 2021 01:03) (81 - 86)  RR: 18 (21 Apr 2021 11:19) (18 - 20)  SpO2: 98% (21 Apr 2021 01:03) (95% - 98%)    General: sleeping, ill-appearing with mild distress, lying in bed, NAD  HEENT:  NCAT  Resp: Unlabored RR24 slight increased WOB, On NC  :  Bunn  Musc: No C/C/E  feet dressing; contracted  Neuro: does not follow commands, not responsive  Psych: o agitation   Skin: Non jaundiced, wound per nursing      MEDICATIONS: REVIEWED  MEDICATIONS  (STANDING):  amLODIPine   Tablet 5 milliGRAM(s) Oral daily  aspirin Suppository 300 milliGRAM(s) Rectal daily  atorvastatin 80 milliGRAM(s) Oral at bedtime  clopidogrel Tablet 75 milliGRAM(s) Oral daily  collagenase Ointment 1 Application(s) Topical two times a day  dextrose 40% Gel 15 Gram(s) Oral once  dextrose 5%. 1000 milliLiter(s) (50 mL/Hr) IV Continuous <Continuous>  dextrose 5%. 1000 milliLiter(s) (100 mL/Hr) IV Continuous <Continuous>  dextrose 50% Injectable 25 Gram(s) IV Push once  dextrose 50% Injectable 12.5 Gram(s) IV Push once  dextrose 50% Injectable 25 Gram(s) IV Push once  glucagon  Injectable 1 milliGRAM(s) IntraMuscular once  heparin   Injectable 5000 Unit(s) SubCutaneous every 12 hours  insulin glargine Injectable (LANTUS) 10 Unit(s) SubCutaneous at bedtime  insulin lispro (ADMELOG) corrective regimen sliding scale   SubCutaneous three times a day before meals  insulin lispro Injectable (ADMELOG) 3 Unit(s) SubCutaneous three times a day before meals  scopolamine 1 mG/72 Hr(s) Patch 1 Patch Transdermal every 72 hours    MEDICATIONS  (PRN):      LABS: REVIEWED  CBC:                        9.6    14.04 )-----------( 336      ( 21 Apr 2021 05:21 )             31.8     CMP:    04-21    147<H>  |  117<H>  |  37<H>  ----------------------------<  59<L>  4.9   |  17  |  2.9<H>    Ca    8.7      21 Apr 2021 05:21  Mg     2.1     04-21    TPro  5.6<L>  /  Alb  3.0<L>  /  TBili  0.6  /  DBili  x   /  AST  83<H>  /  ALT  27  /  AlkPhos  108  04-21  Albumin, Serum: 3.0 g/dL (04-21-21 @ 05:21)    ADVANCED DIRECTIVES:                 DNR DNI         DECISION MAKER: SonAbhinav, in collaboration with daughter of patient.     GOALS OF CARE DISCUSSION  ongoing with Stephen at bedside  explain that she is in dying process; can do hospice consult but likely to die here; all questions answered; wanting sister to visit  all questions answered  >16 minutes    CURRENT DISPO PLAN:     WILL REMAIN IN HOSPITAL

## 2021-04-22 NOTE — PROGRESS NOTE ADULT - PROBLEM SELECTOR PLAN 1
DNR/DNI  Continue medical management   Family deciding on comfort care versus ongoing med mgmt (PEG)   Will follow up re: HARJINDER tomorrow
DNR/DNI  Continue medical management  Will re-address GOC tomorrow with son at bedside
DNR/DNI  Continue medical management   Family deciding on comfort care versus ongoing med care; No PEG  Hospice consult for information  Will follow up re: HARJINDER tomorrow

## 2021-04-22 NOTE — PROGRESS NOTE ADULT - ASSESSMENT
89yFemale being evaluated for goals of care in the setting of acute infarct on the left side in LOY and MCA territory. Patient remains largely unresponsive, and family wishes to give her some time to see if she recovers. Last night patient desatted and required O2 supplementation, has increased secretions in airway. Patient remains comfortable, with ongoing medical management for now.     Will follow up with family likely tomorrow to re-address GOC. Family was undecided when we last spoke about comfort care and hospice.    MEDD (morphine equivalent daily dose): 0      See Recs below.    Please call [phone] with questions or concerns 24/7.   We will continue to follow.

## 2021-04-22 NOTE — PROGRESS NOTE ADULT - PROBLEM SELECTOR PLAN 3
Family declined debridement after discussing with BURN
Bedbound X 3 yrs   Requires ATC care   PT/OT unable to participate due to functional status
Family declined debridement after discussing with BURN

## 2021-04-22 NOTE — HOSPICE CARE NOTE - CONVESATION DETAILS
Attempts to reach patient's son Abhinav unsuccessful. Left message with hospice contact info. Hospice to follow up tomorrow.

## 2021-04-22 NOTE — PROGRESS NOTE ADULT - ASSESSMENT
89 years old female with PMHx of DM, HTN, bilateral diabetic foot ulcers, Atrial fibrillation (no AC) , CHF (possible HFpEF) , who presents to ED with complaint of non-responsiveness. Patient has a massive left sided stroke and likely has grave prognosis. Discussed in detail with the son who demonstrated good understanding of the situation. Counselled on the gravity of stroke and minimal to zero chance of any meaningful recovery.   Son discussed the case with sister. Discussed feeding options and family wants a trial of NGT feeding. NGT placed and feeding started on 04/21.    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 Patient unable to cooperate  - palliative care following  - Will give tube feeding for now  - Family wants to give her a trial of few days on tube feeding and then decide on further course of action    # Chronic Afib not on AC  - CHADVASC ~ 6  - not on any AC (age related decision not to anticoagulate)  - EKG c/w AFib , heart rate controlled    # HTN  - Hold antihypertensives    # Bilateral lower ext foot ulcer  - diabetic foot ulcer, last debrided at [facility] 6 weeks ago  - podiatry recs noted, wound care orders placed   - Burn consulted but family refused intervention    DVT ppx : Heparin  GI ppx : IV protonix  bedrest  Code DNR/DNI 89 years old female with PMHx of DM, HTN, bilateral diabetic foot ulcers, Atrial fibrillation (no AC) , CHF (possible HFpEF) , who presents to ED with complaint of non-responsiveness. Patient has a massive left sided stroke and likely has grave prognosis. Discussed in detail with the son who demonstrated good understanding of the situation. Counselled on the gravity of stroke and minimal to zero chance of any meaningful recovery.   Son discussed the case with sister. Discussed feeding options and family wants a trial of NGT feeding. NGT placed and feeding started on 04/21.    Will continue with goals of care discussion and for now will give a trial of tube feeding. If fails to recover which is highly unlikely family leaning towards hospice.    # Acute LOY/MCA territory ischemic stroke 2/2 Left ICA occlusion  - likely cardio-embolic given history of Afib  - CTH shows acute infarct in left LOY and MCA territory  - NIHSS score over 30 for motor deficit in all the extremities and non-responsiveness  - was out of window for t-PA and too extensive damage for neurointervention  - Continue on ASA and Statin  - Downgraded from stroke unit  - MRI Head shows no hemorrhagic conversion but mass effect  - PT OT evaluated. Patient unable to cooperate  - palliative care following  - Will give tube feeding for now  - Family wants to give her a trial of few days on tube feeding and then decide on further course of action    # Chronic Afib not on AC  - CHADVASC ~ 6  - not on any AC (age related decision not to anticoagulate)  - EKG c/w AFib , heart rate controlled    # HTN  - Hold antihypertensives    # Bilateral lower ext foot ulcer  - diabetic foot ulcer, last debrided at [facility] 6 weeks ago  - podiatry recs noted, wound care orders placed   - Burn consulted but family refused intervention    DVT ppx : Heparin  GI ppx : IV protonix  bedrest  Code DNR/DNI 89 years old female with PMHx of DM, HTN, bilateral diabetic foot ulcers, Atrial fibrillation (no AC) , CHF (possible HFpEF) , who presents to ED with complaint of non-responsiveness. Patient has a massive left sided stroke and likely has grave prognosis. Discussed in detail with the son who demonstrated good understanding of the situation. Counselled on the gravity of stroke and minimal to zero chance of any meaningful recovery.   Son discussed the case with sister. Discussed feeding options and family wants a trial of NGT feeding. NGT placed and feeding started on 04/21.    Will continue with goals of care discussion and for now will give a trial of tube feeding. If fails to recover which is highly unlikely family leaning towards hospice. Case discussed with Palliative.    # Acute LOY/MCA territory ischemic stroke 2/2 Left ICA occlusion  - likely cardio-embolic given history of Afib  - CTH shows acute infarct in left LOY and MCA territory  - NIHSS score over 30 for motor deficit in all the extremities and non-responsiveness  - was out of window for t-PA and too extensive damage for neurointervention  - Continue on ASA and Statin  - Downgraded from stroke unit  - MRI Head shows no hemorrhagic conversion but mass effect  - PT OT evaluated. Patient unable to cooperate  - palliative care following  - Will give tube feeding for now  - Family wants to give her a trial of few days on tube feeding and then decide on further course of action    # Chronic Afib not on AC  - CHADVASC ~ 6  - not on any AC (age related decision not to anticoagulate)  - EKG c/w AFib , heart rate controlled    # HTN  - Hold antihypertensives    # Bilateral lower ext foot ulcer  - diabetic foot ulcer, last debrided at [facility] 6 weeks ago  - podiatry recs noted, wound care orders placed   - Burn consulted but family refused intervention    DVT ppx : Heparin  GI ppx : IV protonix  bedrest  Code DNR/DNI

## 2021-04-22 NOTE — PROGRESS NOTE ADULT - SUBJECTIVE AND OBJECTIVE BOX
SUBJECTIVE:    Patient is a 89y old Female who presents with a chief complaint of Stroke (21 Apr 2021 15:38)    Currently admitted to medicine with the primary diagnosis of Stroke       Today is hospital day 4d. This morning she is resting comfortably in bed and reports no new issues or overnight events.     INTERVAL EVENTS: Patient completely obtunded. Responds to pain only. Otherwise NGT placed as per discussion with family and feeding started    PAST MEDICAL & SURGICAL HISTORY  Diabetes mellitus    CHF (congestive heart failure)        ALLERGIES:  No Known Allergies    MEDICATIONS:  STANDING MEDICATIONS  amLODIPine   Tablet 5 milliGRAM(s) Oral daily  aspirin Suppository 300 milliGRAM(s) Rectal daily  atorvastatin 80 milliGRAM(s) Oral at bedtime  clopidogrel Tablet 75 milliGRAM(s) Oral daily  collagenase Ointment 1 Application(s) Topical two times a day  dextrose 40% Gel 15 Gram(s) Oral once  dextrose 5%. 1000 milliLiter(s) IV Continuous <Continuous>  dextrose 5%. 1000 milliLiter(s) IV Continuous <Continuous>  dextrose 50% Injectable 25 Gram(s) IV Push once  dextrose 50% Injectable 12.5 Gram(s) IV Push once  dextrose 50% Injectable 25 Gram(s) IV Push once  glucagon  Injectable 1 milliGRAM(s) IntraMuscular once  heparin   Injectable 5000 Unit(s) SubCutaneous every 12 hours  insulin glargine Injectable (LANTUS) 10 Unit(s) SubCutaneous at bedtime  insulin lispro (ADMELOG) corrective regimen sliding scale   SubCutaneous three times a day before meals  insulin lispro Injectable (ADMELOG) 3 Unit(s) SubCutaneous three times a day before meals  scopolamine 1 mG/72 Hr(s) Patch 1 Patch Transdermal every 72 hours    PRN MEDICATIONS  acetaminophen   Tablet .. 650 milliGRAM(s) Oral every 6 hours PRN    VITALS:   T(F): 97.4  HR: 102  BP: 102/59  RR: 23  SpO2: 98%    LABS:                        8.9    18.51 )-----------( 358      ( 22 Apr 2021 07:31 )             30.2     04-22    147<H>  |  118<H>  |  47<H>  ----------------------------<  201<H>  4.8   |  12<L>  |  3.5<H>    Ca    8.6      22 Apr 2021 07:31  Mg     2.2     04-22    TPro  5.3<L>  /  Alb  2.7<L>  /  TBili  0.7  /  DBili  x   /  AST  59<H>  /  ALT  35  /  AlkPhos  112  04-22              Culture - Urine (collected 20 Apr 2021 04:50)  Source: .Urine Clean Catch (Midstream)  Preliminary Report (22 Apr 2021 03:32):    >100,000 CFU/ml Gram positive organisms          RADIOLOGY:    PHYSICAL EXAM:  GEN: No acute distress, on NC and NGT  PULM/CHEST: Bilateral Crackles  CVS: Irregularly irregular  ABD: Soft, non-tender, non-distended, +BS  EXT: No edema  NEURO: mute, moves only right arm, responds to pain on the left arm      Bunn Catheter:   Indwelling Urethral Catheter:     Connect To:  Straight Drainage/Gravity    Indication:  Traumatic Injury requiring immobilization (04-18-21 @ 17:21) (not performed) [Active]

## 2021-04-22 NOTE — DISCHARGE NOTE FOR THE EXPIRED PATIENT - HOSPITAL COURSE
89 years old female with PMHx of DM, HTN, bilateral diabetic foot ulcers, Atrial Fibrillation (no AC) , CHF, who presents to ED with complaint of non-responsiveness.  History was obtained from son Abhinav by the bedside. As per son at bedside patient usually is verbal and has eyes open. She was seen last seen normal at 4AM later around 1 pm, son noticed that patient was unresponsive and not able to open eyes. She would not respond to the pain stimulus Patient was recently discharged from [facility] s/p fall, leg fx and ulcer debridements. Stroke code activated in ED. NIHSS score was 16. t-PA was not infused because she was out of window. During exam patient did not respond even to pain stimuli, would spontaneously move her left extremities with no response on the right. Patient continued to have no improvement in the NIHSS. Palliative was following, having ongoing [facility] conversations with son, Abhinav. Decided to make pt DNR/DNI and were considering Hospice. Pt was developing hypotension, no improvement with IV fluids. Was soon after called by nurse because pt was found to have no pulse. Time of death 11:02. Family at bedside. 89 years old female with PMHx of DM, HTN, bilateral diabetic foot ulcers, Atrial Fibrillation (no AC) , CHF, who presents to ED with complaint of non-responsiveness.  History was obtained from son Abhinav by the bedside. As per son at bedside patient usually is verbal and has eyes open. She was seen last seen normal at 4AM later around 1 pm, son noticed that patient was unresponsive and not able to open eyes. She would not respond to the pain stimulus Patient was recently discharged from [facility] s/p fall, leg fx and ulcer debridements. Stroke code activated in ED. NIHSS score was 16. t-PA was not infused because she was out of window. During exam patient did not respond even to pain stimuli, would spontaneously move her left extremities with no response on the right. Patient continued to have no improvement in the NIHSS. Palliative was following, having ongoing [facility] conversations with son, Abhinav. Decided to make pt DNR/DNI and were considering Hospice. Pt was developing hypotension, no improvement with IV fluids. Was soon after called by nurse because pt was found to have no pulse.     On physical exam, patient did not respond to verbal or noxious stimuli. No spontaneous respirations. Absent heart and breath sounds. Absent radial and carotid pulses. EKG rhythm strip shows asystole.     Time of death 11:02. Family at bedside. Attending, Dr. Jordan, notified.

## 2021-04-23 LAB
CULTURE RESULTS: SIGNIFICANT CHANGE UP
CULTURE RESULTS: SIGNIFICANT CHANGE UP
SPECIMEN SOURCE: SIGNIFICANT CHANGE UP
SPECIMEN SOURCE: SIGNIFICANT CHANGE UP

## 2021-04-24 LAB

## 2021-05-04 DIAGNOSIS — L89.153 PRESSURE ULCER OF SACRAL REGION, STAGE 3: ICD-10-CM

## 2021-05-04 DIAGNOSIS — I48.0 PAROXYSMAL ATRIAL FIBRILLATION: ICD-10-CM

## 2021-05-04 DIAGNOSIS — I63.132 CEREBRAL INFARCTION DUE TO EMBOLISM OF LEFT CAROTID ARTERY: ICD-10-CM

## 2021-05-04 DIAGNOSIS — Z79.4 LONG TERM (CURRENT) USE OF INSULIN: ICD-10-CM

## 2021-05-04 DIAGNOSIS — E87.5 HYPERKALEMIA: ICD-10-CM

## 2021-05-04 DIAGNOSIS — E11.621 TYPE 2 DIABETES MELLITUS WITH FOOT ULCER: ICD-10-CM

## 2021-05-04 DIAGNOSIS — I50.32 CHRONIC DIASTOLIC (CONGESTIVE) HEART FAILURE: ICD-10-CM

## 2021-05-04 DIAGNOSIS — R47.01 APHASIA: ICD-10-CM

## 2021-05-04 DIAGNOSIS — I48.20 CHRONIC ATRIAL FIBRILLATION, UNSPECIFIED: ICD-10-CM

## 2021-05-04 DIAGNOSIS — R53.2 FUNCTIONAL QUADRIPLEGIA: ICD-10-CM

## 2021-05-04 DIAGNOSIS — I11.0 HYPERTENSIVE HEART DISEASE WITH HEART FAILURE: ICD-10-CM

## 2021-05-04 DIAGNOSIS — I95.9 HYPOTENSION, UNSPECIFIED: ICD-10-CM

## 2021-05-04 DIAGNOSIS — I63.9 CEREBRAL INFARCTION, UNSPECIFIED: ICD-10-CM

## 2021-05-04 DIAGNOSIS — N17.9 ACUTE KIDNEY FAILURE, UNSPECIFIED: ICD-10-CM

## 2021-05-04 DIAGNOSIS — R29.716 NIHSS SCORE 16: ICD-10-CM

## 2021-05-04 DIAGNOSIS — Z51.5 ENCOUNTER FOR PALLIATIVE CARE: ICD-10-CM

## 2021-05-04 DIAGNOSIS — G81.91 HEMIPLEGIA, UNSPECIFIED AFFECTING RIGHT DOMINANT SIDE: ICD-10-CM

## 2021-05-04 DIAGNOSIS — L97.909 NON-PRESSURE CHRONIC ULCER OF UNSPECIFIED PART OF UNSPECIFIED LOWER LEG WITH UNSPECIFIED SEVERITY: ICD-10-CM

## 2021-05-04 DIAGNOSIS — Z66 DO NOT RESUSCITATE: ICD-10-CM

## 2025-02-27 NOTE — PROGRESS NOTE ADULT - PROBLEM/PLAN-4
Quality 226: Preventive Care And Screening: Tobacco Use: Screening And Cessation Intervention: Patient screened for tobacco use and is an ex/non-smoker Quality 431: Preventive Care And Screening: Unhealthy Alcohol Use - Screening: Patient not identified as an unhealthy alcohol user when screened for unhealthy alcohol use using a systematic screening method Detail Level: Detailed DISPLAY PLAN FREE TEXT